# Patient Record
Sex: MALE | Race: WHITE | NOT HISPANIC OR LATINO | Employment: OTHER | URBAN - NONMETROPOLITAN AREA
[De-identification: names, ages, dates, MRNs, and addresses within clinical notes are randomized per-mention and may not be internally consistent; named-entity substitution may affect disease eponyms.]

---

## 2017-01-02 ENCOUNTER — HOSPITAL ENCOUNTER (INPATIENT)
Facility: HOSPITAL | Age: 72
LOS: 1 days | Discharge: HOME | End: 2017-01-04
Attending: INTERNAL MEDICINE | Admitting: INTERNAL MEDICINE

## 2017-01-02 ENCOUNTER — APPOINTMENT (OUTPATIENT)
Dept: CT IMAGING | Facility: HOSPITAL | Age: 72
End: 2017-01-02

## 2017-01-02 DIAGNOSIS — K56.609 SMALL BOWEL OBSTRUCTION (HCC): Primary | ICD-10-CM

## 2017-01-02 LAB
ALBUMIN SERPL-MCNC: 4 G/DL (ref 3.4–4.8)
ALBUMIN/GLOB SERPL: 1.5 G/DL (ref 1.5–2.5)
ALP SERPL-CCNC: 37 U/L (ref 46–116)
ALT SERPL W P-5'-P-CCNC: 39 U/L (ref 10–44)
ANION GAP SERPL CALCULATED.3IONS-SCNC: 6 MMOL/L (ref 3.6–11.2)
AST SERPL-CCNC: 36 U/L (ref 10–34)
BACTERIA UR QL AUTO: ABNORMAL /HPF
BASOPHILS # BLD AUTO: 0.01 10*3/MM3 (ref 0–0.3)
BASOPHILS NFR BLD AUTO: 0.1 % (ref 0–2)
BILIRUB SERPL-MCNC: 1 MG/DL (ref 0.2–1.8)
BILIRUB UR QL STRIP: NEGATIVE
BUN BLD-MCNC: 15 MG/DL (ref 7–21)
BUN/CREAT SERPL: 14.7 (ref 7–25)
CALCIUM SPEC-SCNC: 9.2 MG/DL (ref 7.7–10)
CHLORIDE SERPL-SCNC: 106 MMOL/L (ref 99–112)
CLARITY UR: CLEAR
CO2 SERPL-SCNC: 27 MMOL/L (ref 24.3–31.9)
COLOR UR: YELLOW
CREAT BLD-MCNC: 1.02 MG/DL (ref 0.43–1.29)
CRP SERPL-MCNC: 4.48 MG/DL (ref 0–0.99)
DEPRECATED RDW RBC AUTO: 49.1 FL (ref 37–54)
EOSINOPHIL # BLD AUTO: 0.08 10*3/MM3 (ref 0–0.7)
EOSINOPHIL NFR BLD AUTO: 1.1 % (ref 0–7)
ERYTHROCYTE [DISTWIDTH] IN BLOOD BY AUTOMATED COUNT: 14.6 % (ref 11.5–14.5)
GFR SERPL CREATININE-BSD FRML MDRD: 72 ML/MIN/1.73
GLOBULIN UR ELPH-MCNC: 2.6 GM/DL
GLUCOSE BLD-MCNC: 172 MG/DL (ref 70–110)
GLUCOSE BLDC GLUCOMTR-MCNC: 127 MG/DL (ref 70–130)
GLUCOSE UR STRIP-MCNC: NEGATIVE MG/DL
HBA1C MFR BLD: 6.9 % (ref 4.5–5.7)
HCT VFR BLD AUTO: 47.8 % (ref 42–52)
HGB BLD-MCNC: 15.8 G/DL (ref 14–18)
HGB UR QL STRIP.AUTO: ABNORMAL
HOLD SPECIMEN: NORMAL
HOLD SPECIMEN: NORMAL
HYALINE CASTS UR QL AUTO: ABNORMAL /LPF
IMM GRANULOCYTES # BLD: 0.03 10*3/MM3 (ref 0–0.03)
IMM GRANULOCYTES NFR BLD: 0.4 % (ref 0–0.5)
KETONES UR QL STRIP: NEGATIVE
LEUKOCYTE ESTERASE UR QL STRIP.AUTO: NEGATIVE
LIPASE SERPL-CCNC: 18 U/L (ref 13–60)
LYMPHOCYTES # BLD AUTO: 2.27 10*3/MM3 (ref 1–3)
LYMPHOCYTES NFR BLD AUTO: 30.6 % (ref 16–46)
MCH RBC QN AUTO: 30.4 PG (ref 27–33)
MCHC RBC AUTO-ENTMCNC: 33.1 G/DL (ref 33–37)
MCV RBC AUTO: 92.1 FL (ref 80–94)
MONOCYTES # BLD AUTO: 0.71 10*3/MM3 (ref 0.1–0.9)
MONOCYTES NFR BLD AUTO: 9.6 % (ref 0–12)
NEUTROPHILS # BLD AUTO: 4.33 10*3/MM3 (ref 1.4–6.5)
NEUTROPHILS NFR BLD AUTO: 58.2 % (ref 40–75)
NITRITE UR QL STRIP: NEGATIVE
OSMOLALITY SERPL CALC.SUM OF ELEC: 282.5 MOSM/KG (ref 273–305)
PH UR STRIP.AUTO: 5.5 [PH] (ref 5–8)
PLATELET # BLD AUTO: 100 10*3/MM3 (ref 130–400)
PMV BLD AUTO: 11.1 FL (ref 6–10)
POTASSIUM BLD-SCNC: 3.6 MMOL/L (ref 3.5–5.3)
PROT SERPL-MCNC: 6.6 G/DL (ref 6–8)
PROT UR QL STRIP: NEGATIVE
RBC # BLD AUTO: 5.19 10*6/MM3 (ref 4.7–6.1)
RBC # UR: ABNORMAL /HPF
REF LAB TEST METHOD: ABNORMAL
SODIUM BLD-SCNC: 139 MMOL/L (ref 135–153)
SP GR UR STRIP: >1.03 (ref 1–1.03)
SQUAMOUS #/AREA URNS HPF: ABNORMAL /HPF
UROBILINOGEN UR QL STRIP: ABNORMAL
WBC NRBC COR # BLD: 7.43 10*3/MM3 (ref 4.5–12.5)
WBC UR QL AUTO: ABNORMAL /HPF
WHOLE BLOOD HOLD SPECIMEN: NORMAL
WHOLE BLOOD HOLD SPECIMEN: NORMAL

## 2017-01-02 PROCEDURE — 83036 HEMOGLOBIN GLYCOSYLATED A1C: CPT | Performed by: INTERNAL MEDICINE

## 2017-01-02 PROCEDURE — 25010000002 MORPHINE SULFATE (PF) 2 MG/ML SOLUTION: Performed by: NURSE PRACTITIONER

## 2017-01-02 PROCEDURE — 25010000002 ONDANSETRON PER 1 MG: Performed by: NURSE PRACTITIONER

## 2017-01-02 PROCEDURE — G0378 HOSPITAL OBSERVATION PER HR: HCPCS

## 2017-01-02 PROCEDURE — 74177 CT ABD & PELVIS W/CONTRAST: CPT | Performed by: RADIOLOGY

## 2017-01-02 PROCEDURE — 83690 ASSAY OF LIPASE: CPT | Performed by: INTERNAL MEDICINE

## 2017-01-02 PROCEDURE — 81001 URINALYSIS AUTO W/SCOPE: CPT | Performed by: INTERNAL MEDICINE

## 2017-01-02 PROCEDURE — 80053 COMPREHEN METABOLIC PANEL: CPT | Performed by: INTERNAL MEDICINE

## 2017-01-02 PROCEDURE — 74177 CT ABD & PELVIS W/CONTRAST: CPT

## 2017-01-02 PROCEDURE — 85025 COMPLETE CBC W/AUTO DIFF WBC: CPT | Performed by: INTERNAL MEDICINE

## 2017-01-02 PROCEDURE — 93005 ELECTROCARDIOGRAM TRACING: CPT | Performed by: NURSE PRACTITIONER

## 2017-01-02 PROCEDURE — 93010 ELECTROCARDIOGRAM REPORT: CPT | Performed by: INTERNAL MEDICINE

## 2017-01-02 PROCEDURE — 99284 EMERGENCY DEPT VISIT MOD MDM: CPT

## 2017-01-02 PROCEDURE — 86140 C-REACTIVE PROTEIN: CPT | Performed by: INTERNAL MEDICINE

## 2017-01-02 PROCEDURE — 25010000002 HYDROMORPHONE PER 4 MG: Performed by: NURSE PRACTITIONER

## 2017-01-02 PROCEDURE — 0 IOPAMIDOL 61 % SOLUTION: Performed by: NURSE PRACTITIONER

## 2017-01-02 PROCEDURE — 99223 1ST HOSP IP/OBS HIGH 75: CPT | Performed by: INTERNAL MEDICINE

## 2017-01-02 PROCEDURE — 82962 GLUCOSE BLOOD TEST: CPT

## 2017-01-02 RX ORDER — POTASSIUM CHLORIDE 750 MG/1
10 TABLET, FILM COATED, EXTENDED RELEASE ORAL EVERY MORNING
Status: CANCELLED | OUTPATIENT
Start: 2017-01-03

## 2017-01-02 RX ORDER — CITALOPRAM 40 MG/1
40 TABLET ORAL DAILY
COMMUNITY

## 2017-01-02 RX ORDER — LISINOPRIL 2.5 MG/1
2.5 TABLET ORAL DAILY
Status: CANCELLED | OUTPATIENT
Start: 2017-01-03

## 2017-01-02 RX ORDER — ZOLPIDEM TARTRATE 5 MG/1
5 TABLET ORAL NIGHTLY
Status: CANCELLED | OUTPATIENT
Start: 2017-01-03

## 2017-01-02 RX ORDER — LORAZEPAM 0.5 MG/1
0.5 TABLET ORAL EVERY 8 HOURS PRN
Status: CANCELLED | OUTPATIENT
Start: 2017-01-02

## 2017-01-02 RX ORDER — OXYCODONE HCL 10 MG/1
10 TABLET, FILM COATED, EXTENDED RELEASE ORAL 3 TIMES DAILY PRN
Status: CANCELLED | OUTPATIENT
Start: 2017-01-02

## 2017-01-02 RX ORDER — CHLORZOXAZONE 500 MG/1
500 TABLET ORAL 4 TIMES DAILY PRN
COMMUNITY

## 2017-01-02 RX ORDER — SODIUM CHLORIDE 0.9 % (FLUSH) 0.9 %
10 SYRINGE (ML) INJECTION AS NEEDED
Status: DISCONTINUED | OUTPATIENT
Start: 2017-01-02 | End: 2017-01-04 | Stop reason: HOSPADM

## 2017-01-02 RX ORDER — FUROSEMIDE 80 MG
80 TABLET ORAL DAILY
Status: ON HOLD | COMMUNITY
End: 2017-01-04

## 2017-01-02 RX ORDER — POTASSIUM CHLORIDE 750 MG/1
10 TABLET, FILM COATED, EXTENDED RELEASE ORAL EVERY MORNING
COMMUNITY

## 2017-01-02 RX ORDER — ONDANSETRON 2 MG/ML
4 INJECTION INTRAMUSCULAR; INTRAVENOUS EVERY 6 HOURS PRN
Status: DISCONTINUED | OUTPATIENT
Start: 2017-01-02 | End: 2017-01-02

## 2017-01-02 RX ORDER — FUROSEMIDE 80 MG
80 TABLET ORAL DAILY
Status: CANCELLED | OUTPATIENT
Start: 2017-01-03

## 2017-01-02 RX ORDER — PIOGLITAZONEHYDROCHLORIDE 15 MG/1
30 TABLET ORAL DAILY
Status: CANCELLED | OUTPATIENT
Start: 2017-01-03

## 2017-01-02 RX ORDER — LISINOPRIL 2.5 MG/1
2.5 TABLET ORAL DAILY
COMMUNITY

## 2017-01-02 RX ORDER — GLYBURIDE 5 MG/1
10 TABLET ORAL 2 TIMES DAILY
COMMUNITY

## 2017-01-02 RX ORDER — PANTOPRAZOLE SODIUM 40 MG/10ML
40 INJECTION, POWDER, LYOPHILIZED, FOR SOLUTION INTRAVENOUS
Status: DISCONTINUED | OUTPATIENT
Start: 2017-01-03 | End: 2017-01-04

## 2017-01-02 RX ORDER — HYDROMORPHONE HYDROCHLORIDE 1 MG/ML
0.5 INJECTION, SOLUTION INTRAMUSCULAR; INTRAVENOUS; SUBCUTANEOUS ONCE
Status: COMPLETED | OUTPATIENT
Start: 2017-01-02 | End: 2017-01-02

## 2017-01-02 RX ORDER — NORTRIPTYLINE HYDROCHLORIDE 25 MG/1
50 CAPSULE ORAL NIGHTLY
Status: CANCELLED | OUTPATIENT
Start: 2017-01-03

## 2017-01-02 RX ORDER — PIOGLITAZONEHYDROCHLORIDE 30 MG/1
30 TABLET ORAL DAILY
COMMUNITY

## 2017-01-02 RX ORDER — ASPIRIN 81 MG/1
81 TABLET, CHEWABLE ORAL DAILY
COMMUNITY

## 2017-01-02 RX ORDER — PREGABALIN 100 MG/1
100 CAPSULE ORAL 3 TIMES DAILY
COMMUNITY

## 2017-01-02 RX ORDER — SODIUM CHLORIDE 9 MG/ML
INJECTION, SOLUTION INTRAVENOUS
Status: DISPENSED
Start: 2017-01-02 | End: 2017-01-03

## 2017-01-02 RX ORDER — HEPARIN SODIUM 5000 [USP'U]/ML
5000 INJECTION, SOLUTION INTRAVENOUS; SUBCUTANEOUS EVERY 12 HOURS
Status: DISCONTINUED | OUTPATIENT
Start: 2017-01-02 | End: 2017-01-02

## 2017-01-02 RX ORDER — GLIPIZIDE 5 MG/1
10 TABLET ORAL
Status: CANCELLED | OUTPATIENT
Start: 2017-01-03

## 2017-01-02 RX ORDER — SODIUM CHLORIDE 0.9 % (FLUSH) 0.9 %
1-10 SYRINGE (ML) INJECTION AS NEEDED
Status: DISCONTINUED | OUTPATIENT
Start: 2017-01-02 | End: 2017-01-04 | Stop reason: HOSPADM

## 2017-01-02 RX ORDER — SODIUM CHLORIDE 9 MG/ML
50 INJECTION, SOLUTION INTRAVENOUS CONTINUOUS
Status: DISCONTINUED | OUTPATIENT
Start: 2017-01-02 | End: 2017-01-04

## 2017-01-02 RX ORDER — ONDANSETRON 2 MG/ML
4 INJECTION INTRAMUSCULAR; INTRAVENOUS ONCE
Status: COMPLETED | OUTPATIENT
Start: 2017-01-02 | End: 2017-01-02

## 2017-01-02 RX ORDER — HYDRALAZINE HYDROCHLORIDE 20 MG/ML
10 INJECTION INTRAMUSCULAR; INTRAVENOUS EVERY 6 HOURS PRN
Status: DISCONTINUED | OUTPATIENT
Start: 2017-01-02 | End: 2017-01-04 | Stop reason: HOSPADM

## 2017-01-02 RX ORDER — LORAZEPAM 0.5 MG/1
0.5 TABLET ORAL EVERY 8 HOURS PRN
COMMUNITY

## 2017-01-02 RX ORDER — ASPIRIN 81 MG/1
81 TABLET, CHEWABLE ORAL DAILY
Status: CANCELLED | OUTPATIENT
Start: 2017-01-03

## 2017-01-02 RX ORDER — HYDROMORPHONE HYDROCHLORIDE 1 MG/ML
0.5 INJECTION, SOLUTION INTRAMUSCULAR; INTRAVENOUS; SUBCUTANEOUS
Status: DISCONTINUED | OUTPATIENT
Start: 2017-01-02 | End: 2017-01-04 | Stop reason: HOSPADM

## 2017-01-02 RX ORDER — CITALOPRAM 20 MG/1
40 TABLET ORAL DAILY
Status: CANCELLED | OUTPATIENT
Start: 2017-01-03

## 2017-01-02 RX ORDER — NALOXONE HCL 0.4 MG/ML
0.4 VIAL (ML) INJECTION
Status: DISCONTINUED | OUTPATIENT
Start: 2017-01-02 | End: 2017-01-04 | Stop reason: HOSPADM

## 2017-01-02 RX ORDER — LORAZEPAM 2 MG/ML
0.25 INJECTION INTRAMUSCULAR EVERY 6 HOURS PRN
Status: DISCONTINUED | OUTPATIENT
Start: 2017-01-02 | End: 2017-01-04 | Stop reason: HOSPADM

## 2017-01-02 RX ORDER — PREDNISONE 10 MG/1
10 TABLET ORAL EVERY MORNING
COMMUNITY

## 2017-01-02 RX ORDER — OXYCODONE HCL 10 MG/1
10 TABLET, FILM COATED, EXTENDED RELEASE ORAL 3 TIMES DAILY PRN
COMMUNITY

## 2017-01-02 RX ORDER — CHLORAL HYDRATE 500 MG
2000 CAPSULE ORAL 2 TIMES DAILY WITH MEALS
COMMUNITY

## 2017-01-02 RX ORDER — PREDNISONE 10 MG/1
10 TABLET ORAL EVERY MORNING
Status: CANCELLED | OUTPATIENT
Start: 2017-01-03

## 2017-01-02 RX ORDER — SODIUM CHLORIDE 9 MG/ML
75 INJECTION, SOLUTION INTRAVENOUS ONCE
Status: COMPLETED | OUTPATIENT
Start: 2017-01-02 | End: 2017-01-02

## 2017-01-02 RX ORDER — MORPHINE SULFATE 2 MG/ML
2 INJECTION, SOLUTION INTRAMUSCULAR; INTRAVENOUS ONCE
Status: COMPLETED | OUTPATIENT
Start: 2017-01-02 | End: 2017-01-02

## 2017-01-02 RX ORDER — NORTRIPTYLINE HYDROCHLORIDE 50 MG/1
50 CAPSULE ORAL NIGHTLY
COMMUNITY

## 2017-01-02 RX ORDER — ZOLPIDEM TARTRATE 10 MG/1
10 TABLET ORAL NIGHTLY
COMMUNITY
End: 2017-01-04 | Stop reason: HOSPADM

## 2017-01-02 RX ORDER — PREGABALIN 100 MG/1
100 CAPSULE ORAL 3 TIMES DAILY
Status: CANCELLED | OUTPATIENT
Start: 2017-01-03

## 2017-01-02 RX ORDER — CHLORZOXAZONE 500 MG/1
500 TABLET ORAL 4 TIMES DAILY PRN
Status: CANCELLED | OUTPATIENT
Start: 2017-01-02

## 2017-01-02 RX ADMIN — MORPHINE SULFATE 2 MG: 2 INJECTION, SOLUTION INTRAMUSCULAR; INTRAVENOUS at 15:32

## 2017-01-02 RX ADMIN — ONDANSETRON 4 MG: 2 INJECTION, SOLUTION INTRAMUSCULAR; INTRAVENOUS at 18:10

## 2017-01-02 RX ADMIN — SODIUM CHLORIDE 75 ML/HR: 9 INJECTION, SOLUTION INTRAVENOUS at 18:09

## 2017-01-02 RX ADMIN — SODIUM CHLORIDE 100 ML/HR: 9 INJECTION, SOLUTION INTRAVENOUS at 21:49

## 2017-01-02 RX ADMIN — HYDROMORPHONE HYDROCHLORIDE 0.5 MG: 1 INJECTION, SOLUTION INTRAMUSCULAR; INTRAVENOUS; SUBCUTANEOUS at 19:41

## 2017-01-02 RX ADMIN — ONDANSETRON 4 MG: 2 INJECTION, SOLUTION INTRAMUSCULAR; INTRAVENOUS at 15:32

## 2017-01-02 RX ADMIN — IOPAMIDOL 100 ML: 612 INJECTION, SOLUTION INTRAVENOUS at 16:05

## 2017-01-02 RX ADMIN — SODIUM CHLORIDE 250 ML: 9 INJECTION, SOLUTION INTRAVENOUS at 15:33

## 2017-01-02 RX ADMIN — HYDROMORPHONE HYDROCHLORIDE 0.5 MG: 1 INJECTION, SOLUTION INTRAMUSCULAR; INTRAVENOUS; SUBCUTANEOUS at 18:10

## 2017-01-02 NOTE — IP AVS SNAPSHOT
AFTER VISIT SUMMARY             Moe Bridges           About your hospitalization     You were admitted on:  January 2, 2017 You last received care in the:  [address]       Procedures & Surgeries         Medications    If you or your caregiver advised us that you are currently taking a medication and that medication is marked below as “Resume”, this simply indicates that we have reviewed those medications to make sure our new therapy recommendations do not interfere.  If you have concerns about medications other than those new ones which we are prescribing today, please consult the physician who prescribed them (or your primary physician).  Our review of your home medications is not meant to indicate that we are directing their use.             Your Medications      CHANGE how you take these medications     furosemide 80 MG tablet   Take 0.5 tablets by mouth Daily As Needed (for swelling).   Commonly known as:  LASIX   What changed:    - how much to take  - when to take this  - reasons to take this             CONTINUE taking these medications     ACTOS 30 MG tablet   Take 30 mg by mouth Daily.   Generic drug:  pioglitazone           aspirin 81 MG chewable tablet   Chew 81 mg Daily.   Last time this was given:  1/4/2017  1:16 PM           chlorzoxazone 500 MG tablet   Take 500 mg by mouth 4 (Four) Times a Day As Needed for muscle spasms.   Commonly known as:  PARAFON FORTE           citalopram 40 MG tablet   Take 40 mg by mouth Daily.   Last time this was given:  1/4/2017  1:15 PM   Commonly known as:  CeleXA           fish oil 1000 MG capsule capsule   Take 2,000 mg by mouth 2 (Two) Times a Day With Meals.           glyBURIDE 5 MG tablet   Take 10 mg by mouth 2 (Two) Times a Day.   Commonly known as:  DIAbeta           lisinopril 2.5 MG tablet   Take 2.5 mg by mouth Daily.   Last time this was given:  1/4/2017  1:15 PM   Commonly known as:  PRINIVIL,ZESTRIL           LORazepam 0.5 MG  tablet   Take 0.5 mg by mouth Every 8 (Eight) Hours As Needed for anxiety.   Commonly known as:  ATIVAN           nortriptyline 50 MG capsule   Take 50 mg by mouth Every Night.   Commonly known as:  PAMELOR           oxyCODONE 10 MG 12 hr tablet   Take 10 mg by mouth 3 (Three) Times a Day As Needed for severe pain (7-10).   Commonly known as:  oxyCONTIN           potassium chloride 10 MEQ CR tablet   Take 10 mEq by mouth Every Morning.   Commonly known as:  K-DUR           predniSONE 10 MG tablet   Take 10 mg by mouth Every Morning.   Last time this was given:  1/4/2017  1:16 PM   Commonly known as:  DELTASONE           pregabalin 100 MG capsule   Take 100 mg by mouth 3 (Three) Times a Day.   Last time this was given:  1/4/2017  1:31 PM   Commonly known as:  LYRICA             STOP taking these medications     AMBIEN 10 MG tablet   Generic drug:  zolpidem                Where to Get Your Medications      Information about where to get these medications is not yet available     ! Ask your nurse or doctor about these medications     furosemide 80 MG tablet                  Your Medications      Your Medication List           Morning Noon Evening Bedtime As Needed    ACTOS 30 MG tablet   Take 30 mg by mouth Daily.   Generic drug:  pioglitazone                                aspirin 81 MG chewable tablet   Chew 81 mg Daily.                                chlorzoxazone 500 MG tablet   Take 500 mg by mouth 4 (Four) Times a Day As Needed for muscle spasms.   Commonly known as:  PARAFON FORTE                                citalopram 40 MG tablet   Take 40 mg by mouth Daily.   Commonly known as:  CeleXA                                fish oil 1000 MG capsule capsule   Take 2,000 mg by mouth 2 (Two) Times a Day With Meals.                                furosemide 80 MG tablet   Take 0.5 tablets by mouth Daily As Needed (for swelling).   Commonly known as:  LASIX                                glyBURIDE 5 MG tablet   Take 10  mg by mouth 2 (Two) Times a Day.   Commonly known as:  DIAbeta                                lisinopril 2.5 MG tablet   Take 2.5 mg by mouth Daily.   Commonly known as:  PRINIVIL,ZESTRIL                                LORazepam 0.5 MG tablet   Take 0.5 mg by mouth Every 8 (Eight) Hours As Needed for anxiety.   Commonly known as:  ATIVAN                                nortriptyline 50 MG capsule   Take 50 mg by mouth Every Night.   Commonly known as:  PAMELOR                                oxyCODONE 10 MG 12 hr tablet   Take 10 mg by mouth 3 (Three) Times a Day As Needed for severe pain (7-10).   Commonly known as:  oxyCONTIN                                potassium chloride 10 MEQ CR tablet   Take 10 mEq by mouth Every Morning.   Commonly known as:  K-DUR                                predniSONE 10 MG tablet   Take 10 mg by mouth Every Morning.   Commonly known as:  DELTASONE                                pregabalin 100 MG capsule   Take 100 mg by mouth 3 (Three) Times a Day.   Commonly known as:  LYRICA                                         Instructions for After Discharge        Activity Instructions     Activity as Tolerated                 Diet Instructions     Diet: Regular, Consistent Carbohydrate, Cardiac; Thin Liquids, No Restrictions       Discharge Diet:   Regular  Consistent Carbohydrate  Cardiac      Fluid Consistency:  Thin Liquids, No Restrictions             Discharge References/Attachments     SMALL BOWEL OBSTRUCTION (ENGLISH)    ABDOMINAL PAIN, ADULT (ENGLISH)    DIABETES MELLITUS AND FOOD (ENGLISH)    BASIC CARBOHYDRATE COUNTING FOR DIABETES (ENGLISH)    CARDIAC DIET, EASY-TO-READ (ENGLISH)       Follow-ups for After Discharge        Follow-up Information     Follow up with Chirs Haji MD .    Specialty:  General Practice    Contact information:    [address]  [place] [address] [phone]        Referrals and Follow-ups to Schedule     Follow-Up    As directed    Follow Up Details:   with PCP in one week             Scheduled Appointments     Apr 03, 2017  2:20 PM EDT   Follow Up with Diallo Francis MD   [facility] CARDIOLOGY (--)    [address] [place] [address] [phone]           Arrive 15 minutes prior to appointment.            Additional instructions:      Follow-up appointment with Chris Haji Jan. 18, 2017 at 1:00              [employer] Signup     Our records indicate that you have an active [employer] account.    You can view your After Visit Summary by going to [employer] and logging in with your [employer] username and password.  If you don't have a [employer] username and password but a parent or guardian has access to your record, the parent or guardian should login with their own [employer] username and password and access your record to view the After Visit Summary.    If you have questions, you can email [email or web address] or call [phone] to talk to our [employer] staff.  Remember, [employer] is NOT to be used for urgent needs.  For medical emergencies, dial 911.           Summary of Your Hospitalization        Reason for Hospitalization     Your primary diagnosis was:  Small Bowel Obstruction      Care Providers     Provider Service Role Specialty    Maryanne Sanders DO Medicine Attending Provider Hospitalist    Maryanne Sanders DO Medicine Consulting Physician  Hospitalist      Your Allergies  Date Reviewed: 1/2/2017    Allergen Reactions    Handclens 2 In 1 (Benzalkonium Chloride) Not Noted      Patient Belongings Returned     Document Return of Belongings Flowsheet     Were the patient bedside belongings sent home?   --   Belongings Retrieved from Security & Sent Home   --    Belongings Sent to Safe   --   Medications Retrieved from Pharmacy & Sent Home   --              More Information      Small Bowel Obstruction  A small bowel obstruction is a blockage in the small bowel. The small bowel, which is also  called the small intestine, is a long, slender tube that connects the stomach to the colon. When a person eats and drinks, food and fluids go from the stomach to the small bowel. This is where most of the nutrients in the food and fluids are absorbed.  A small bowel obstruction will prevent food and fluids from passing through the small bowel as they normally do during digestion. The small bowel can become partially or completely blocked. This can cause symptoms such as abdominal pain, vomiting, and bloating. If this condition is not treated, it can be dangerous because the small bowel could rupture.  CAUSES  Common causes of this condition include:  · Scar tissue from previous surgery or radiation treatment.  · Recent surgery. This may cause the movements of the bowel to slow down and cause food to block the intestine.  · Hernias.  · Inflammatory bowel disease (colitis).  · Twisting of the bowel (volvulus).  · Tumors.  · A foreign body.  · Slipping of a part of the bowel into another part (intussusception).  SYMPTOMS  Symptoms of this condition include:  · Abdominal pain. This may be dull cramps or sharp pain. It may occur in one area, or it may be present in the entire abdomen. Pain can range from mild to severe, depending on the degree of obstruction.  · Nausea and vomiting. Vomit may be greenish or a yellow bile color.  · Abdominal bloating.  · Constipation.  · Lack of passing gas.  · Frequent belching.  · Diarrhea. This may occur if the obstruction is partial and runny stool is able to leak around the obstruction.  DIAGNOSIS  This condition may be diagnosed based on a physical exam, medical history, and X-rays of the abdomen. You may also have other tests, such as a CT scan of the abdomen and pelvis.  TREATMENT  Treatment for this condition depends on the cause and severity of the problem. Treatment options may include:  · Bed rest along with fluids and pain medicines that are given through an IV tube inserted  into one of your veins. Sometimes, this is all that is needed for the obstruction to improve.  · Following a simple diet. In some cases, a clear liquid diet may be required for several days. This allows the bowel to rest.  · Placement of a small tube (nasogastric tube) into the stomach. When the bowel is blocked, it usually swells up like a balloon that is filled with air and fluids. The air and fluids may be removed by suction through the nasogastric tube. This can help with pain, discomfort, and nausea. It can also help the obstruction to clear up faster.  · Surgery. This may be required if other treatments do not work. Bowel obstruction from a hernia may require early surgery and can be an emergency procedure. Surgery may also be required for scar tissue that causes frequent or severe obstructions.  HOME CARE INSTRUCTIONS  · Get plenty of rest.  · Follow instructions from your health care provider about eating restrictions. You may need to avoid solid foods and consume only clear liquids until your condition improves.  · Take over-the-counter and prescription medicines only as told by your health care provider.  · Keep all follow-up visits as told by your health care provider. This is important.  SEEK MEDICAL CARE IF:  · You have a fever.  · You have chills.  SEEK IMMEDIATE MEDICAL CARE IF:  · You have increased pain or cramping.  · You vomit blood.  · You have uncontrolled vomiting or nausea.  · You cannot drink fluids because of vomiting or pain.  · You develop confusion.  · You begin feeling very dry or thirsty (dehydrated).  · You have severe bloating.  · You feel extremely weak or you faint.     This information is not intended to replace advice given to you by your health care provider. Make sure you discuss any questions you have with your health care provider.     Document Released: 03/06/2007 Document Revised: 09/07/2016 Document Reviewed: 02/11/2016  Elsevier Interactive Patient Education ©2016 Elsevier  Inc.          Abdominal Pain, Adult  Many things can cause abdominal pain. Usually, abdominal pain is not caused by a disease and will improve without treatment. It can often be observed and treated at home. Your health care provider will do a physical exam and possibly order blood tests and X-rays to help determine the seriousness of your pain. However, in many cases, more time must pass before a clear cause of the pain can be found. Before that point, your health care provider may not know if you need more testing or further treatment.  HOME CARE INSTRUCTIONS  Monitor your abdominal pain for any changes. The following actions may help to alleviate any discomfort you are experiencing:  · Only take over-the-counter or prescription medicines as directed by your health care provider.  · Do not take laxatives unless directed to do so by your health care provider.  · Try a clear liquid diet (broth, tea, or water) as directed by your health care provider. Slowly move to a bland diet as tolerated.  SEEK MEDICAL CARE IF:  · You have unexplained abdominal pain.  · You have abdominal pain associated with nausea or diarrhea.  · You have pain when you urinate or have a bowel movement.  · You experience abdominal pain that wakes you in the night.  · You have abdominal pain that is worsened or improved by eating food.  · You have abdominal pain that is worsened with eating fatty foods.  · You have a fever.  SEEK IMMEDIATE MEDICAL CARE IF:  · Your pain does not go away within 2 hours.  · You keep throwing up (vomiting).  · Your pain is felt only in portions of the abdomen, such as the right side or the left lower portion of the abdomen.  · You pass bloody or black tarry stools.  MAKE SURE YOU:  · Understand these instructions.  · Will watch your condition.  · Will get help right away if you are not doing well or get worse.     This information is not intended to replace advice given to you by your health care provider. Make sure  you discuss any questions you have with your health care provider.     Document Released: 09/27/2006 Document Revised: 09/07/2016 Document Reviewed: 08/27/2014  [employer] Interactive Patient Education ©2016 Elsevier Inc.          Diabetes Mellitus and Food  It is important for you to manage your blood sugar (glucose) level. Your blood glucose level can be greatly affected by what you eat. Eating healthier foods in the appropriate amounts throughout the day at about the same time each day will help you control your blood glucose level. It can also help slow or prevent worsening of your diabetes mellitus. Healthy eating may even help you improve the level of your blood pressure and reach or maintain a healthy weight.   General recommendations for healthful eating and cooking habits include:  · Eating meals and snacks regularly. Avoid going long periods of time without eating to lose weight.  · Eating a diet that consists mainly of plant-based foods, such as fruits, vegetables, nuts, legumes, and whole grains.  · Using low-heat cooking methods, such as baking, instead of high-heat cooking methods, such as deep frying.  Work with your dietitian to make sure you understand how to use the Nutrition Facts information on food labels.  HOW CAN FOOD AFFECT ME?  Carbohydrates  Carbohydrates affect your blood glucose level more than any other type of food. Your dietitian will help you determine how many carbohydrates to eat at each meal and teach you how to count carbohydrates. Counting carbohydrates is important to keep your blood glucose at a healthy level, especially if you are using insulin or taking certain medicines for diabetes mellitus.  Alcohol  Alcohol can cause sudden decreases in blood glucose (hypoglycemia), especially if you use insulin or take certain medicines for diabetes mellitus. Hypoglycemia can be a life-threatening condition. Symptoms of hypoglycemia (sleepiness, dizziness, and disorientation) are similar  to symptoms of having too much alcohol.   If your health care provider has given you approval to drink alcohol, do so in moderation and use the following guidelines:  · Women should not have more than one drink per day, and men should not have more than two drinks per day. One drink is equal to:    12 oz of beer.    5 oz of wine.    1½ oz of hard liquor.  · Do not drink on an empty stomach.  · Keep yourself hydrated. Have water, diet soda, or unsweetened iced tea.  · Regular soda, juice, and other mixers might contain a lot of carbohydrates and should be counted.  WHAT FOODS ARE NOT RECOMMENDED?  As you make food choices, it is important to remember that all foods are not the same. Some foods have fewer nutrients per serving than other foods, even though they might have the same number of calories or carbohydrates. It is difficult to get your body what it needs when you eat foods with fewer nutrients. Examples of foods that you should avoid that are high in calories and carbohydrates but low in nutrients include:  · Trans fats (most processed foods list trans fats on the Nutrition Facts label).  · Regular soda.  · Juice.  · Candy.  · Sweets, such as cake, pie, doughnuts, and cookies.  · Fried foods.  WHAT FOODS CAN I EAT?  Eat nutrient-rich foods, which will nourish your body and keep you healthy. The food you should eat also will depend on several factors, including:  · The calories you need.  · The medicines you take.  · Your weight.  · Your blood glucose level.  · Your blood pressure level.  · Your cholesterol level.  You should eat a variety of foods, including:  · Protein.    Lean cuts of meat.    Proteins low in saturated fats, such as fish, egg whites, and beans. Avoid processed meats.  · Fruits and vegetables.    Fruits and vegetables that may help control blood glucose levels, such as apples, mangoes, and yams.  · Dairy products.    Choose fat-free or low-fat dairy products, such as milk, yogurt, and  cheese.  · Grains, bread, pasta, and rice.    Choose whole grain products, such as multigrain bread, whole oats, and brown rice. These foods may help control blood pressure.  · Fats.    Foods containing healthful fats, such as nuts, avocado, olive oil, canola oil, and fish.  DOES EVERYONE WITH DIABETES MELLITUS HAVE THE SAME MEAL PLAN?  Because every person with diabetes mellitus is different, there is not one meal plan that works for everyone. It is very important that you meet with a dietitian who will help you create a meal plan that is just right for you.     This information is not intended to replace advice given to you by your health care provider. Make sure you discuss any questions you have with your health care provider.     Document Released: 09/14/2006 Document Revised: 01/08/2016 Document Reviewed: 11/14/2014  "[employer] Interactive Patient Education ©2016 "[employer] Inc.          Basic Carbohydrate Counting for Diabetes Mellitus  Carbohydrate counting is a method for keeping track of the amount of carbohydrates you eat. Eating carbohydrates naturally increases the level of sugar (glucose) in your blood, so it is important for you to know the amount that is okay for you to have in every meal. Carbohydrate counting helps keep the level of glucose in your blood within normal limits. The amount of carbohydrates allowed is different for every person. A dietitian can help you calculate the amount that is right for you. Once you know the amount of carbohydrates you can have, you can count the carbohydrates in the foods you want to eat.  Carbohydrates are found in the following foods:  · Grains, such as breads and cereals.  · Dried beans and soy products.  · Starchy vegetables, such as potatoes, peas, and corn.  · Fruit and fruit juices.  · Milk and yogurt.  · Sweets and snack foods, such as cake, cookies, candy, chips, soft drinks, and fruit drinks.  CARBOHYDRATE COUNTING  There are two ways to count the  "carbohydrates in your food. You can use either of the methods or a combination of both.  Reading the \"Nutrition Facts\" on Packaged Food  The \"Nutrition Facts\" is an area that is included on the labels of almost all packaged food and beverages in the [place]. It includes the serving size of that food or beverage and information about the nutrients in each serving of the food, including the grams (g) of carbohydrate per serving.   Decide the number of servings of this food or beverage that you will be able to eat or drink. Multiply that number of servings by the number of grams of carbohydrate that is listed on the label for that serving. The total will be the amount of carbohydrates you will be having when you eat or drink this food or beverage.  Learning Standard Serving Sizes of Food  When you eat food that is not packaged or does not include \"Nutrition Facts\" on the label, you need to measure the servings in order to count the amount of carbohydrates. A serving of most carbohydrate-rich foods contains about 15 g of carbohydrates. The following list includes serving sizes of carbohydrate-rich foods that provide 15 g of carbohydrate per serving:    · 1 slice of bread (1 oz) or 1 six-inch tortilla.    · ½ of a hamburger bun or English muffin.  · 4-6 crackers.  · ¾ cup unsweetened dry cereal.    · ½ cup hot cereal.  · ? cup rice or pasta.    · ½ cup mashed potatoes or ¼ of a large baked potato.  · 1 cup fresh fruit or one small piece of fruit.    · ½ cup canned or frozen fruit or fruit juice.  · 1 cup milk.  · ? cup plain fat-free yogurt or yogurt sweetened with artificial sweeteners.  · ½ cup cooked dried beans or starchy vegetable, such as peas, corn, or potatoes.    Decide the number of standard-size servings that you will eat. Multiply that number of servings by 15 (the grams of carbohydrates in that serving). For example, if you eat 2 cups of strawberries, you will have eaten 2 servings and 30 g of " carbohydrates (2 servings x 15 g = 30 g). For foods such as soups and casseroles, in which more than one food is mixed in, you will need to count the carbohydrates in each food that is included.  EXAMPLE OF CARBOHYDRATE COUNTING  Sample Dinner   · 3 oz chicken breast.  · ? cup of brown rice.  · ½ cup of corn.  · 1 cup milk.    · 1 cup strawberries with sugar-free whipped topping.    Carbohydrate Calculation   Step 1: Identify the foods that contain carbohydrates:   · Rice.    · Corn.    · Milk.    · Strawberries.  Step 2: Calculate the number of servings eaten of each:   · 2 servings of rice.    · 1 serving of corn.    · 1 serving of milk.    · 1 serving of strawberries.  Step 3:  Multiply each of those number of servings by 15 g:   · 2 servings of rice x 15 g = 30 g.    · 1 serving of corn x 15 g = 15 g.    · 1 serving of milk x 15 g = 15 g.    · 1 serving of strawberries x 15 g = 15 g.  Step 4: Add together all of the amounts to find the total grams of carbohydrates eaten: 30 g + 15 g + 15 g + 15 g = 75 g.     This information is not intended to replace advice given to you by your health care provider. Make sure you discuss any questions you have with your health care provider.     Document Released: 12/18/2006 Document Revised: 01/08/2016 Document Reviewed: 11/14/2014  [employer] Interactive Patient Education ©2016 [employer].          Heart-Healthy Eating Plan  Heart-healthy meal planning includes:  · Limiting unhealthy fats.  · Increasing healthy fats.  · Making other small dietary changes.  You may need to talk with your doctor or a diet specialist (dietitian) to create an eating plan that is right for you.  WHAT TYPES OF FAT SHOULD I CHOOSE?  · Choose healthy fats. These include olive oil and canola oil, flaxseeds, walnuts, almonds, and seeds.  · Eat more omega-3 fats. These include salmon, mackerel, sardines, tuna, flaxseed oil, and ground flaxseeds. Try to eat fish at least twice each week.  · Limit  "saturated fats.    Saturated fats are often found in animal products, such as meats, butter, and cream.    Plant sources of saturated fats include palm oil, palm kernel oil, and coconut oil.  · Avoid foods with partially hydrogenated oils in them. These include stick margarine, some tub margarines, cookies, crackers, and other baked goods. These contain trans fats.  WHAT GENERAL GUIDELINES DO I NEED TO FOLLOW?  · Check food labels carefully. Identify foods with trans fats or high amounts of saturated fat.  · Fill one half of your plate with vegetables and green salads. Eat 4-5 servings of vegetables per day. A serving of vegetables is:    1 cup of raw leafy vegetables.    ½ cup of raw or cooked cut-up vegetables.    ½ cup of vegetable juice.  · Fill one fourth of your plate with whole grains. Look for the word \"whole\" as the first word in the ingredient list.  · Fill one fourth of your plate with lean protein foods.  · Eat 4-5 servings of fruit per day. A serving of fruit is:    One medium whole fruit.    ¼ cup of dried fruit.    ½ cup of fresh, frozen, or canned fruit.    ½ cup of 100% fruit juice.  · Eat more foods that contain soluble fiber. These include apples, broccoli, carrots, beans, peas, and barley. Try to get 20-30 g of fiber per day.  · Eat more home-cooked food. Eat less restaurant, buffet, and fast food.  · Limit or avoid alcohol.  · Limit foods high in starch and sugar.  · Avoid fried foods.  · Avoid frying your food. Try baking, boiling, grilling, or broiling it instead. You can also reduce fat by:    Removing the skin from poultry.    Removing all visible fats from meats.    Skimming the fat off of stews, soups, and gravies before serving them.    Steaming vegetables in water or broth.  · Lose weight if you are overweight.  · Eat 4-5 servings of nuts, legumes, and seeds per week:    One serving of dried beans or legumes equals ½ cup after being cooked.    One serving of nuts equals 1½ ounces.    One " serving of seeds equals ½ ounce or one tablespoon.  · You may need to keep track of how much salt or sodium you eat. This is especially true if you have high blood pressure. Talk with your doctor or dietitian to get more information.  WHAT FOODS CAN I EAT?  Grains  Breads, including [place], white, soheila, wheat, raisin, rye, oatmeal, and Italian. Tortillas that are neither fried nor made with lard or trans fat. Low-fat rolls, including hotdog and hamburger buns and English muffins. Biscuits. Muffins. Waffles. Pancakes. Light popcorn. Whole-grain cereals. Flatbread. [place] toast. Pretzels. Breadsticks. Rusks. Low-fat snacks. Low-fat crackers, including oyster, saltine, matzo, deborah, animal, and rye. Rice and pasta, including brown rice and pastas that are made with whole wheat.   Vegetables  All vegetables.   Fruits  All fruits, but limit coconut.  Meats and Other Protein Sources  Lean, well-trimmed beef, veal, pork, and lamb. Chicken and turkey without skin. All fish and shellfish. Wild duck, rabbit, pheasant, and venison. Egg whites or low-cholesterol egg substitutes. Dried beans, peas, lentils, and tofu. Seeds and most nuts.  Dairy  Low-fat or nonfat cheeses, including ricotta, string, and mozzarella. Skim or 1% milk that is liquid, powdered, or evaporated. Buttermilk that is made with low-fat milk. Nonfat or low-fat yogurt.  Beverages  Mineral water. Diet carbonated beverages.  Sweets and Desserts  Sherbets and fruit ices. Honey, jam, marmalade, jelly, and syrups. Meringues and gelatins. Pure sugar candy, such as hard candy, jelly beans, gumdrops, mints, marshmallows, and small amounts of dark chocolate. [place]apha food cake.  Eat all sweets and desserts in moderation.  Fats and Oils  Nonhydrogenated (trans-free) margarines. Vegetable oils, including soybean, sesame, sunflower, olive, peanut, safflower, corn, canola, and cottonseed. Salad dressings or mayonnaise made with a vegetable oil. Limit added fats and oils that  you use for cooking, baking, salads, and as spreads.  Other  Cocoa powder. Coffee and tea. All seasonings and condiments.  The items listed above may not be a complete list of recommended foods or beverages. Contact your dietitian for more options.  WHAT FOODS ARE NOT RECOMMENDED?  Grains  Breads that are made with saturated or trans fats, oils, or whole milk. Croissants. Butter rolls. Cheese breads. Sweet rolls. Donuts. Buttered popcorn. Chow mein noodles. High-fat crackers, such as cheese or butter crackers.  Meats and Other Protein Sources  Fatty meats, such as hotdogs, short ribs, sausage, spareribs, burciaga, rib eye roast or steak, and mutton. High-fat deli meats, such as salami and bologna. Caviar. Domestic duck and goose. Organ meats, such as kidney, liver, sweetbreads, and heart.  Dairy  Cream, sour cream, cream cheese, and creamed cottage cheese. Whole-milk cheeses, including blue (anika), Kinde Jean Marie, Brie, Tree, American, Havarti, Swiss, cheddar, Camembert, and Delight. Whole or 2% milk that is liquid, evaporated, or condensed. Whole buttermilk. Cream sauce or high-fat cheese sauce. Yogurt that is made from whole milk.  Beverages  Regular sodas and juice drinks with added sugar.  Sweets and Desserts  Frosting. Pudding. Cookies. Cakes other than solo food cake. Candy that has milk chocolate or white chocolate, hydrogenated fat, butter, coconut, or unknown ingredients. Buttered syrups. Full-fat ice cream or ice cream drinks.  Fats and Oils  Gravy that has suet, meat fat, or shortening. Cocoa butter, hydrogenated oils, palm oil, coconut oil, palm kernel oil. These can often be found in baked products, candy, fried foods, nondairy creamers, and whipped toppings. Solid fats and shortenings, including burciaga fat, salt pork, lard, and butter. Nondairy cream substitutes, such as coffee creamers and sour cream substitutes. Salad dressings that are made of unknown oils, cheese, or sour cream.  The items listed  above may not be a complete list of foods and beverages to avoid. Contact your dietitian for more information.     This information is not intended to replace advice given to you by your health care provider. Make sure you discuss any questions you have with your health care provider.     Document Released: 06/18/2013 Document Revised: 01/08/2016 Document Reviewed: 06/11/2015  [employer] Interactive Patient Education ©2016 [employer]            SYMPTOMS OF A STROKE    Call 911 or have someone take you to the Emergency Department if you have any of the following:    · Sudden numbness or weakness of your face, arm or leg especially on one side of the body  · Sudden confusion, diffiiculty speaking or trouble understanding   · Changes in your vision or loss of sight in one eye  · Sudden severe headache with no known cause  · sudden dizziness, trouble walking, loss of balance or coordination    It is important to seek emergency care right away if you have further stroke symptoms. If you get emergency help quickly, the powerful clot-dissolving medicines can reduce the disabilities caused by a stroke.     For more information:    American Stroke Association  9-298-0-STROKE  www.strokeassociation.org           IF YOU SMOKE OR USE TOBACCO PLEASE READ THE FOLLOWING:    Why is smoking bad for me?  Smoking increases the risk of heart disease, lung disease, vascular disease, stroke, and cancer.     If you smoke, STOP!    If you would like more information on quitting smoking, please visit the [place] website: [email or web address]   This link will provide additional resources including the QUIT line and the Beat the Pack support groups.     For more information:    American Cancer Society  ([phone] 736-6603    American Heart Association  1-611.743.5891               YOU ARE THE MOST IMPORTANT FACTOR IN YOUR RECOVERY.     Follow all instructions carefully.     I have reviewed my  discharge instructions with my nurse, including the following information, if applicable:     Information about my illness and diagnosis   Follow up appointments (including lab draws)   Wound Care   Equipment Needs   Medications (new and continuing) along with side effects   Preventative information such as vaccines and smoking cessations   Diet   Pain   I know when to contact my Doctor's office or seek emergency care      I want my nurse to describe the side effects of my medications: YES NO   If the answer is no, I understand the side effects of my medications: YES NO   My nurse described the side effects of my medications in a way that I could understand: YES NO   I have taken my personal belongings and my own medications with me at discharge: YES NO            I have received this information and my questions have been answered. I have discussed any concerns I see with this plan with the nurse or physician. I understand these instructions.    Signature of Patient or Responsible Person: _____________________________________    Date: _________________  Time: __________________    Signature of Healthcare Provider: _______________________________________  Date: _________________  Time: __________________

## 2017-01-02 NOTE — ED PROVIDER NOTES
Subjective   Patient is a 71 y.o. male presenting with abdominal pain.   History provided by:  Patient   used: No    Abdominal Pain   Pain location:  LUQ and LLQ  Pain quality: aching    Pain radiates to:  Does not radiate  Pain severity:  No pain  Onset quality:  Sudden  Duration:  1 day  Timing:  Constant  Progression:  Worsening  Chronicity:  New  Context: not alcohol use, not awakening from sleep, not diet changes, not medication withdrawal, not previous surgeries, not recent illness, not retching, not sick contacts and not suspicious food intake    Relieved by:  Nothing  Worsened by:  Nothing  Ineffective treatments:  None tried  Associated symptoms: diarrhea, nausea and vomiting    Associated symptoms: no anorexia, no chest pain, no constipation, no cough, no dysuria, no fatigue, no fever, no hematemesis, no hematuria, no melena, no shortness of breath, no vaginal bleeding and no vaginal discharge    Risk factors: no alcohol abuse, no aspirin use, not elderly, no NSAID use, not pregnant and no recent hospitalization        Review of Systems   Constitutional: Negative.  Negative for fatigue and fever.   HENT: Negative.    Eyes: Negative.    Respiratory: Negative.  Negative for cough and shortness of breath.    Cardiovascular: Negative.  Negative for chest pain.   Gastrointestinal: Positive for abdominal pain, diarrhea, nausea and vomiting. Negative for anorexia, constipation, hematemesis and melena.   Endocrine: Negative.    Genitourinary: Negative.  Negative for dysuria, hematuria, vaginal bleeding and vaginal discharge.   Musculoskeletal: Negative.    Skin: Negative.    Allergic/Immunologic: Negative.    Neurological: Negative.    Hematological: Negative.    Psychiatric/Behavioral: Negative.        Past Medical History   Diagnosis Date   • A-fib      s/p ablation   • Arthritis    • COPD (chronic obstructive pulmonary disease)    • Diabetes mellitus    • Dyspnea      class 3   • History of  cardiac radiofrequency ablation    • Hypertension    • Neuropathic pain    • Sick sinus syndrome        Allergies   Allergen Reactions   • Handclens 2 In 1 [Benzalkonium Chloride]        Past Surgical History   Procedure Laterality Date   • Pacemaker implantation     • Cholecystectomy     • Carpal tunnel release     • Rotator cuff repair         Family History   Problem Relation Age of Onset   • Heart disease Father    • Heart attack Father    • Heart failure Father        Social History     Social History   • Marital status:      Spouse name: N/A   • Number of children: N/A   • Years of education: N/A     Social History Main Topics   • Smoking status: Never Smoker   • Smokeless tobacco: None   • Alcohol use No   • Drug use: No   • Sexual activity: Defer     Other Topics Concern   • None     Social History Narrative   • None           Objective   Physical Exam   Constitutional: He is oriented to person, place, and time. He appears well-developed and well-nourished.   HENT:   Head: Normocephalic.   Right Ear: External ear normal.   Left Ear: External ear normal.   Mouth/Throat: Oropharynx is clear and moist.   Eyes: Conjunctivae and EOM are normal. Pupils are equal, round, and reactive to light.   Neck: Normal range of motion. Neck supple.   Cardiovascular: Normal rate and regular rhythm.    Pulmonary/Chest: Effort normal and breath sounds normal.   Abdominal: Soft. Bowel sounds are normal. There is tenderness in the left upper quadrant and left lower quadrant.   Musculoskeletal: Normal range of motion.   Neurological: He is alert and oriented to person, place, and time. He has normal reflexes.   Skin: Skin is warm and dry.   Psychiatric: He has a normal mood and affect. His behavior is normal.   Nursing note and vitals reviewed.      Procedures         ED Course  ED Course                  MDM    Final diagnoses:   Small bowel obstruction            Vasile Turk, APRN  01/02/17 2144

## 2017-01-03 LAB
ALBUMIN SERPL-MCNC: 3.5 G/DL (ref 3.4–4.8)
ALBUMIN/GLOB SERPL: 1.3 G/DL (ref 1.5–2.5)
ALP SERPL-CCNC: 33 U/L (ref 46–116)
ALT SERPL W P-5'-P-CCNC: 38 U/L (ref 10–44)
ANION GAP SERPL CALCULATED.3IONS-SCNC: 5.4 MMOL/L (ref 3.6–11.2)
AST SERPL-CCNC: 39 U/L (ref 10–34)
BASOPHILS # BLD AUTO: 0.01 10*3/MM3 (ref 0–0.3)
BASOPHILS NFR BLD AUTO: 0.2 % (ref 0–2)
BILIRUB SERPL-MCNC: 0.8 MG/DL (ref 0.2–1.8)
BUN BLD-MCNC: 12 MG/DL (ref 7–21)
BUN/CREAT SERPL: 12.8 (ref 7–25)
CALCIUM SPEC-SCNC: 8.8 MG/DL (ref 7.7–10)
CHLORIDE SERPL-SCNC: 111 MMOL/L (ref 99–112)
CO2 SERPL-SCNC: 25.6 MMOL/L (ref 24.3–31.9)
CREAT BLD-MCNC: 0.94 MG/DL (ref 0.43–1.29)
CRP SERPL-MCNC: 3.76 MG/DL (ref 0–0.99)
DEPRECATED RDW RBC AUTO: 48.9 FL (ref 37–54)
EOSINOPHIL # BLD AUTO: 0.1 10*3/MM3 (ref 0–0.7)
EOSINOPHIL NFR BLD AUTO: 1.8 % (ref 0–7)
ERYTHROCYTE [DISTWIDTH] IN BLOOD BY AUTOMATED COUNT: 14.4 % (ref 11.5–14.5)
GFR SERPL CREATININE-BSD FRML MDRD: 79 ML/MIN/1.73
GLOBULIN UR ELPH-MCNC: 2.6 GM/DL
GLUCOSE BLD-MCNC: 125 MG/DL (ref 70–110)
GLUCOSE BLDC GLUCOMTR-MCNC: 115 MG/DL (ref 70–130)
GLUCOSE BLDC GLUCOMTR-MCNC: 116 MG/DL (ref 70–130)
GLUCOSE BLDC GLUCOMTR-MCNC: 122 MG/DL (ref 70–130)
GLUCOSE BLDC GLUCOMTR-MCNC: 135 MG/DL (ref 70–130)
HCT VFR BLD AUTO: 45.8 % (ref 42–52)
HGB BLD-MCNC: 14.7 G/DL (ref 14–18)
IMM GRANULOCYTES # BLD: 0.02 10*3/MM3 (ref 0–0.03)
IMM GRANULOCYTES NFR BLD: 0.4 % (ref 0–0.5)
LYMPHOCYTES # BLD AUTO: 2.19 10*3/MM3 (ref 1–3)
LYMPHOCYTES NFR BLD AUTO: 38.7 % (ref 16–46)
MCH RBC QN AUTO: 30 PG (ref 27–33)
MCHC RBC AUTO-ENTMCNC: 32.1 G/DL (ref 33–37)
MCV RBC AUTO: 93.5 FL (ref 80–94)
MONOCYTES # BLD AUTO: 0.64 10*3/MM3 (ref 0.1–0.9)
MONOCYTES NFR BLD AUTO: 11.3 % (ref 0–12)
NEUTROPHILS # BLD AUTO: 2.7 10*3/MM3 (ref 1.4–6.5)
NEUTROPHILS NFR BLD AUTO: 47.6 % (ref 40–75)
OSMOLALITY SERPL CALC.SUM OF ELEC: 284.4 MOSM/KG (ref 273–305)
PLATELET # BLD AUTO: 88 10*3/MM3 (ref 130–400)
PMV BLD AUTO: 11.1 FL (ref 6–10)
POTASSIUM BLD-SCNC: 3.6 MMOL/L (ref 3.5–5.3)
PROT SERPL-MCNC: 6.1 G/DL (ref 6–8)
RBC # BLD AUTO: 4.9 10*6/MM3 (ref 4.7–6.1)
SODIUM BLD-SCNC: 142 MMOL/L (ref 135–153)
WBC NRBC COR # BLD: 5.66 10*3/MM3 (ref 4.5–12.5)

## 2017-01-03 PROCEDURE — 94799 UNLISTED PULMONARY SVC/PX: CPT

## 2017-01-03 PROCEDURE — 86140 C-REACTIVE PROTEIN: CPT | Performed by: INTERNAL MEDICINE

## 2017-01-03 PROCEDURE — 25010000002 HYDROMORPHONE PER 4 MG: Performed by: INTERNAL MEDICINE

## 2017-01-03 PROCEDURE — 25010000002 HYDROCORTISONE SODIUM SUCCINATE 100 MG RECONSTITUTED SOLUTION: Performed by: INTERNAL MEDICINE

## 2017-01-03 PROCEDURE — 93005 ELECTROCARDIOGRAM TRACING: CPT | Performed by: INTERNAL MEDICINE

## 2017-01-03 PROCEDURE — 85025 COMPLETE CBC W/AUTO DIFF WBC: CPT | Performed by: INTERNAL MEDICINE

## 2017-01-03 PROCEDURE — 80053 COMPREHEN METABOLIC PANEL: CPT | Performed by: INTERNAL MEDICINE

## 2017-01-03 PROCEDURE — 82962 GLUCOSE BLOOD TEST: CPT

## 2017-01-03 PROCEDURE — 25010000002 PROMETHAZINE PER 50 MG: Performed by: INTERNAL MEDICINE

## 2017-01-03 PROCEDURE — 93010 ELECTROCARDIOGRAM REPORT: CPT | Performed by: INTERNAL MEDICINE

## 2017-01-03 PROCEDURE — 99233 SBSQ HOSP IP/OBS HIGH 50: CPT | Performed by: INTERNAL MEDICINE

## 2017-01-03 RX ORDER — ZOLPIDEM TARTRATE 5 MG/1
5 TABLET ORAL NIGHTLY PRN
Status: DISCONTINUED | OUTPATIENT
Start: 2017-01-03 | End: 2017-01-04 | Stop reason: HOSPADM

## 2017-01-03 RX ORDER — SODIUM CHLORIDE 9 MG/ML
INJECTION, SOLUTION INTRAVENOUS
Status: DISPENSED
Start: 2017-01-03 | End: 2017-01-04

## 2017-01-03 RX ORDER — SODIUM CHLORIDE 9 MG/ML
INJECTION, SOLUTION INTRAVENOUS
Status: DISPENSED
Start: 2017-01-03 | End: 2017-01-03

## 2017-01-03 RX ADMIN — HYDROMORPHONE HYDROCHLORIDE 0.5 MG: 1 INJECTION, SOLUTION INTRAMUSCULAR; INTRAVENOUS; SUBCUTANEOUS at 05:43

## 2017-01-03 RX ADMIN — HYDROMORPHONE HYDROCHLORIDE 0.5 MG: 1 INJECTION, SOLUTION INTRAMUSCULAR; INTRAVENOUS; SUBCUTANEOUS at 16:45

## 2017-01-03 RX ADMIN — HYDROCORTISONE SODIUM SUCCINATE 25 MG: 100 INJECTION, POWDER, FOR SOLUTION INTRAMUSCULAR; INTRAVENOUS at 11:39

## 2017-01-03 RX ADMIN — PROMETHAZINE HYDROCHLORIDE 12.5 MG: 25 INJECTION INTRAMUSCULAR; INTRAVENOUS at 16:45

## 2017-01-03 RX ADMIN — HYDROCORTISONE SODIUM SUCCINATE 25 MG: 100 INJECTION, POWDER, FOR SOLUTION INTRAMUSCULAR; INTRAVENOUS at 00:51

## 2017-01-03 RX ADMIN — PANTOPRAZOLE SODIUM 40 MG: 40 INJECTION, POWDER, FOR SOLUTION INTRAVENOUS at 05:35

## 2017-01-03 RX ADMIN — SODIUM CHLORIDE 100 ML/HR: 9 INJECTION, SOLUTION INTRAVENOUS at 02:10

## 2017-01-03 RX ADMIN — HYDROMORPHONE HYDROCHLORIDE 0.5 MG: 1 INJECTION, SOLUTION INTRAMUSCULAR; INTRAVENOUS; SUBCUTANEOUS at 11:46

## 2017-01-03 RX ADMIN — PROMETHAZINE HYDROCHLORIDE 12.5 MG: 25 INJECTION INTRAMUSCULAR; INTRAVENOUS at 08:33

## 2017-01-03 RX ADMIN — SODIUM CHLORIDE 100 ML/HR: 9 INJECTION, SOLUTION INTRAVENOUS at 05:43

## 2017-01-03 RX ADMIN — SODIUM CHLORIDE 50 ML/HR: 9 INJECTION, SOLUTION INTRAVENOUS at 16:45

## 2017-01-03 NOTE — PLAN OF CARE
Problem: Patient Care Overview (Adult)  Goal: Plan of Care Review  Outcome: Ongoing (interventions implemented as appropriate)  Goal: Discharge Needs Assessment    01/03/17 1017   Discharge Needs Assessment   Concerns To Be Addressed no discharge needs identified         Problem: Bowel Obstruction (Adult)  Goal: Signs and Symptoms of Listed Potential Problems Will be Absent or Manageable (Bowel Obstruction)  Outcome: Ongoing (interventions implemented as appropriate)    01/03/17 1017   Bowel Obstruction   Problems Assessed (Bowel Obstruction) nausea and vomiting   Problems Present (Bowel Obstruction) nausea and vomiting   No vomiting at this time    Problem: Fall Risk (Adult)  Goal: Identify Related Risk Factors and Signs and Symptoms  Outcome: Ongoing (interventions implemented as appropriate)    01/03/17 1017   Fall Risk   Fall Risk: Related Risk Factors age-related changes;other (see comments)--patient is independent, with occasional nausea    Fall Risk: Signs and Symptoms presence of risk factors         Problem: Diabetes, Type 2 (Adult)  Goal: Signs and Symptoms of Listed Potential Problems Will be Absent or Manageable (Diabetes, Type 2)  Outcome: Ongoing (interventions implemented as appropriate)    01/03/17 1017   Diabetes, Type 2   Problems Assessed (Type 2 Diabetes) impaired glycemic control-will monitor blood glucose   Problems Present (Type 2 Diabetes) none

## 2017-01-03 NOTE — PROGRESS NOTES
Discharge Planning Assessment   [facility]     Patient Name: Moe Bridges  MRN: [id number]  Today's Date: 1/3/2017    Admit Date: 1/2/2017 01/03/17 1339    Case Management/Social Work Plan    Plan SS received consult evaluation. SS spoke to pt and spouse who state no needs at this time. CM following.      Diamond Kay

## 2017-01-03 NOTE — PROGRESS NOTES
Discharge Planning Assessment   [facility]     Patient Name: Moe Bridges  MRN: [id number]  Today's Date: 1/3/2017    Admit Date: 1/2/2017          Discharge Needs Assessment       01/03/17 1110    Living Environment    Lives With spouse   Lives at home with spouse, Em.    Living Arrangements house    Provides Primary Care For no one    Quality Of Family Relationships supportive;involved;helpful   Spouse present at bedside.    Able to Return to Prior Living Arrangements yes    Discharge Needs Assessment    Concerns To Be Addressed no discharge needs identified    Readmission Within The Last 30 Days no previous admission in last 30 days    Equipment Currently Used at Home walker, rolling;other (see comments)   Has a rolling walker for prn use supplied per [facility] and a stick cane.    Equipment Needed After Discharge none    Transportation Available car   Family will provide discharge transportation.    Discharge Disposition home or self-care   Plans to return home at discharge.            Discharge Plan       01/03/17 1114    Case Management/Social Work Plan    Plan Plans to return home at discharge with spouse support.    Patient/Family In Agreement With Plan yes    Additional Comments Came to ED from home with c/o abdominal pain, nausea and vomiting. Admitted with Dx: Small bowel obstruction. NPO. Receiving IV fluids. Surgery consult pending. Platelets  88 today. Spouse present at bedside. No CM needs identified at this time. Will follow and assist as needed.        Discharge Placement     No information found                Demographic Summary       01/03/17 1105    Referral Information    Admission Type inpatient    Arrived From admitted as an inpatient    Referral Source admission list    Reason For Consult discharge planning    Record Reviewed medical record    Primary Care Physician Information    Name Chris Haji MD-PCP.            Functional Status       01/03/17 1106    Functional Status Current     Ambulation 0-->independent   States he has been up ambulatory to restroom.    Transferring 0-->independent    Toileting 0-->independent    Bathing 0-->independent    Dressing 0-->independent    Eating 0-->independent    Communication 0-->understands/communicates without difficulty    Swallowing (if score 2 or more for any item, consult Rehab Services) 0-->swallows foods/liquids without difficulty    Change in Functional Status Since Onset of Current Illness/Injury yes    Functional Status Prior    Ambulation 0-->independent    Transferring 0-->independent    Toileting 0-->independent    Bathing 0-->independent    Dressing 0-->independent    Eating 0-->independent    Communication 0-->understands/communicates without difficulty    Swallowing 0-->swallows foods/liquids without difficulty    Activity Tolerance    Current Activity Limitations bed rest    Usual Activity Tolerance good    Current Activity Tolerance moderate    Cognitive/Perceptual/Developmental    Current Mental Status/Cognitive Functioning no deficits noted   Alert and oriented.    Recent Changes in Mental Status/Cognitive Functioning no changes    Employment/Financial    Financial Concerns --   Medicare [employer] B and [facility] Medicare Supplement is coverage source. Obtains medications at [facility], [facility] and [facility]. States he has issues getting his medications at times. Spouse states  [facility] helps them out. Declines Pharmacy and financial counselling consults.            Psychosocial     None            Abuse/Neglect     None            Legal       01/03/17 1111    Legal    Assistance with Managing/Advocating Healthcare Needs --   States he has a Living will on file. He does not have a POA.            Substance Abuse     None            Patient Forms     None          Guillermina Sexton RN

## 2017-01-03 NOTE — NURSING NOTE
[facility] ADMISSION NOTE:  Patient enrolled in the [facility] program to assist with education and support during transition home from hospital. [facility] home  will see patient at home within 48 hours of discharge and will follow up with patient in home and telephonically for 6 weeks post discharge under the [facility]i Model.    Clinical Assessment Instrument Scores:  Short Portable Mental Status Questionnaire- 9  Geriatric Depression Scale-0  Instrumental Activities of Daily Living-7  SANCHEZ Activities of Daily Living-6  Overall Quality of Life- good  Subjective Health Rating- poor  Symptom Bother Scale-11  Generalized Anxiety Scale-3  Health Literacy Test- 7

## 2017-01-03 NOTE — PLAN OF CARE
Problem: Patient Care Overview (Adult)  Goal: Plan of Care Review  Outcome: Ongoing (interventions implemented as appropriate)  Goal: Adult Individualization and Mutuality  Outcome: Ongoing (interventions implemented as appropriate)    Problem: Bowel Obstruction (Adult)  Goal: Signs and Symptoms of Listed Potential Problems Will be Absent or Manageable (Bowel Obstruction)  Outcome: Ongoing (interventions implemented as appropriate)

## 2017-01-03 NOTE — H&P
"Hospitalist History and Physical    Patient Identification:  Name: Moe Bridges  Age/Sex: 71 y.o. male  :  1945  MRN: [id number]         Primary Care Physician: Chris Haji MD    Chief Complaint   Patient presents with   • Abdominal Pain     pt reports n/v/d x 2 days        History of Present Illness  Patient is a 71 y.o. male presents with the following: Nausea, vomiting, diarrhea and abdominal pain    The patient is a very pleasant 71-year-old male with past medical history significant for atrial fibrillation status post ablation, sick sinus syndrome status post permanent pacemaker placement in , essential hypertension and non-insulin-dependent diabetes mellitus type 2 who presents to the emergency department with the above-mentioned complaints.    The patient states that shortly after dinner on Saturday evening he experienced abdominal bloating with nausea, vomiting and watery diarrhea.  The patient states that he took over-the-counter Imodium as well as a \"nausea pill\" with no improvement in his symptoms.  The patient states that his abdominal discomfort is \"hard to explain\" but is similar to a crampy, sharp pain that is mostly located in the left lower quadrant.  He denies recent fever.  He reports chronic chills.  He denies known sick contacts.  He reports that approximately one year ago he experienced a similar pain in his abdomen but states that it was very short-lived.  He states that he only had a \"twinge\" of pain at that time.    Otherwise, the patient states that he has felt well.  He denies any recent chest pain, dyspnea and/or palpitations.  The patient states that he has severe peripheral neuropathy that has improved with Lyrica.  The patient states that the neurologist in formerly [facility] started him on Lyrica approximately 1-1/2 years ago.    At this time, the patient complains of some mild nausea but denies any vomiting and or significant abdominal pain.  The patient " states that his last episode of vomiting was approximately 12 hours ago.    He states that his last colonoscopy was approximately 7-8 years ago and states that to his knowledge it was completely normal.    Present during visit: BRAYDEN Baez    Past History:  Past Medical History   Diagnosis Date   • A-fib      s/p ablation 2007   • Arthritis    • COPD (chronic obstructive pulmonary disease)    • Diabetes mellitus, non-insulin dependent     Dysequilibrium occurring 30 years ago and only responding to prednisone     Chronic steroid use    • Dyspnea    • Essential Hypertension    • Neuropathic pain    • Sick sinus syndrome status post permanent pacemaker placement      Past Surgical History   Procedure Laterality Date   • Pacemaker implantation Left chest 2010   • Cholecystectomy     • Carpal tunnel release     • Rotator cuff repair, right x3       Family History   Problem Relation Age of Onset   • Heart disease Father    • Heart attack Father    • Heart failure Father      Social History   Substance Use Topics   • Smoking status: Never Smoker   • Smokeless tobacco: None   • Alcohol use No     Prescriptions Prior to Admission   Medication Sig Dispense Refill Last Dose   • aspirin 81 MG chewable tablet Chew 81 mg Daily.   12/31/2016   • chlorzoxazone (PARAFON FORTE) 500 MG tablet Take 500 mg by mouth 4 (Four) Times a Day As Needed for muscle spasms.   12/31/2016   • citalopram (CeleXA) 40 MG tablet Take 40 mg by mouth Daily.   12/31/2016   • furosemide (LASIX) 80 MG tablet Take 80 mg by mouth Daily.   12/31/2016   • glyBURIDE (DIAbeta) 5 MG tablet Take 10 mg by mouth 2 (Two) Times a Day.   12/31/2016   • lisinopril (PRINIVIL,ZESTRIL) 2.5 MG tablet Take 2.5 mg by mouth Daily.   12/31/2016   • LORazepam (ATIVAN) 0.5 MG tablet Take 0.5 mg by mouth Every 8 (Eight) Hours As Needed for anxiety.   12/31/2016   • nortriptyline (PAMELOR) 50 MG capsule Take 50 mg by mouth Every Night.   12/31/2016   • Omega-3 Fatty Acids (FISH OIL)  1000 MG capsule capsule Take 2,000 mg by mouth 2 (Two) Times a Day With Meals.   12/31/2016   • oxyCODONE (oxyCONTIN) 10 MG 12 hr tablet Take 10 mg by mouth 3 (Three) Times a Day As Needed for severe pain (7-10).   12/31/2016   • pioglitazone (ACTOS) 30 MG tablet Take 30 mg by mouth Daily.   12/31/2016   • potassium chloride (K-DUR) 10 MEQ CR tablet Take 10 mEq by mouth Every Morning.   12/31/2016   • predniSONE (DELTASONE) 10 MG tablet Take 10 mg by mouth Every Morning.   12/31/2016   • pregabalin (LYRICA) 100 MG capsule Take 100 mg by mouth 3 (Three) Times a Day.   12/31/2016   • zolpidem (AMBIEN) 10 MG tablet Take 10 mg by mouth Every Night.   12/31/2016     Allergies: Handclens 2 in 1 [benzalkonium chloride]    Review of Systems:  Review of Systems   Constitutional: Positive for chills. Negative for diaphoresis, fever and unexpected weight change.   HENT: Negative for hearing loss, tinnitus and trouble swallowing.    Eyes: Negative for photophobia, discharge and visual disturbance.   Respiratory: Negative for cough, shortness of breath and wheezing.    Cardiovascular: Negative for chest pain, palpitations and leg swelling.   Gastrointestinal: Positive for abdominal distention, abdominal pain, diarrhea, nausea and vomiting. Negative for blood in stool and constipation.   Endocrine: Negative for polydipsia, polyphagia and polyuria.   Genitourinary: Negative for dysuria, frequency and hematuria.   Musculoskeletal: Positive for myalgias. Negative for gait problem and neck pain.   Skin: Negative for rash.   Neurological: Negative for tremors, seizures, syncope, weakness and light-headedness.   Hematological: Does not bruise/bleed easily.   Psychiatric/Behavioral: Negative for confusion, hallucinations and suicidal ideas.        Vital Signs  Temp:  [97.6 °F (36.4 °C)-98.8 °F (37.1 °C)] 97.6 °F (36.4 °C)  Heart Rate:  [65-69] 69  Resp:  [16-20] 18  BP: (143-171)/(80-83) 171/82  Body mass index is 35.48  kg/(m^2).    Physical Exam:  Physical Exam   Constitutional: He is oriented to person, place, and time. He appears well-developed and well-nourished. He does not appear ill. No distress.   Overweight.  Pleasant.   HENT:   Head: Normocephalic and atraumatic.   Nose: Nose normal.   Mouth/Throat: Oropharynx is clear and moist and mucous membranes are normal.   Eyes: Conjunctivae and EOM are normal. Pupils are equal, round, and reactive to light. No scleral icterus.   Neck: Trachea normal. Neck supple. No JVD present. Carotid bruit is not present. No thyromegaly present.   Cardiovascular: Normal rate, regular rhythm, normal heart sounds and normal pulses.  Exam reveals no gallop and no friction rub.    No murmur heard.  No significant lower extremity edema is noted.  The patient has a pacemaker in the left chest.   Pulmonary/Chest: Effort normal. No respiratory distress. He has no wheezes. He has no rales.   Abdominal: Soft. He exhibits no distension. There is tenderness in the left lower quadrant. There is no guarding. A hernia is present. Hernia confirmed positive in the ventral area.   The patient has bowel sounds auscultated in all 4 quadrants.  He has mild tenderness in the left lower quadrant.  He has a small, rounded protuberance noted in the left mid to left lower quadrant region that appears superficial and is not painful.   Musculoskeletal: Normal range of motion.   Neurological: He is alert and oriented to person, place, and time. He has normal strength. No cranial nerve deficit.   Skin: Skin is warm, dry and intact. No rash noted. No erythema.   Mild hyperpigmentation noted on the bilateral lower extremities   Psychiatric: He has a normal mood and affect. His speech is normal.       Results Review:            Results from last 7 days  Lab Units 01/02/17  1321   WBC 10*3/mm3 7.43   HEMOGLOBIN g/dL 15.8   HEMATOCRIT % 47.8   PLATELETS 10*3/mm3 100*       Results from last 7 days  Lab Units 01/02/17  1321    SODIUM mmol/L 139   POTASSIUM mmol/L 3.6   CHLORIDE mmol/L 106   TOTAL CO2 mmol/L 27.0   BUN mg/dL 15   CREATININE mg/dL 1.02   CALCIUM mg/dL 9.2   GLUCOSE mg/dL 172*       Results from last 7 days  Lab Units 01/02/17  1321   BILIRUBIN mg/dL 1.0   ALK PHOS U/L 37*   AST (SGOT) U/L 36*   ALT (SGPT) U/L 39       Results from last 7 days  Lab Units 01/02/17  2102   CRP mg/dL 4.48*       Imaging:    I have personally reviewed the EKG. No comparison EKGs in our system        Imaging Results (most recent)          Assessment/Plan     1.  Left lower quadrant abdominal pain with nausea and vomiting that is probably due to a small bowel obstruction of unclear etiology; differential diagnosis to include adhesions versus due to inflammation and or masses or hernias or other  2.  Mild thrombocytopenia that may be medication induced (ie, lyrica)  3.  Essential hypertension that is currently uncontrolled  4.  Non-insulin-dependent diabetes mellitus type 2   5.  Minimally elevated AST and ALT that may be medication induced versus secondary to fatty liver infiltration versus other  6.  First-degree AV block  7.  History of atrial fibrillation status post ablation  8.  Sick sinus syndrome status post permanent pacemaker placement  9.  Peripheral neuropathy with chronic pain  10. Chronic obstructive pulmonary disease not in an acute exacerbation  11. Upper limit of normal QTC  12. Generalized anxiety disorder  13. Chronic steroid use    The patient has been admitted to the medical surgical floor with telemetry.  He has been made nothing by mouth and is receiving gentle IV fluid hydration with as needed analgesics and anti-emetics (phenergan rather than Zofran due to borderline QTc).  I will begin him on low-dose IV hydrocortisone for now as the patient has been taking prednisone for at least 30 years and I am concerned that discontinuing his prednisone may cause an and renal crisis.  Plan to hold on nasogastric tube insertion at  this time as the patient is symptomatically improved with no significant nausea and no vomiting at this time.  A general surgery consultation has been placed. Will plan to hold all by mouth medications at this time.      I will begin the patient on an as needed IV antihypertensive and will make his Ativan IV as needed as well.  I will place him on Accu-Cheks.  His CRP level is noted to be mildly elevated.  I plan to repeat his labs in the morning. Follow up on the official radiology read of his CT. Repeat his EKG in the am. Further management is pending the patient's clinical course as well as surgical evaluation.    DVT/GI prophylaxis: SCDs (no heparin products due to thrombocytopenia); IV PPI    Estimated Length of Stay: < 2 MNs    I discussed the patients findings and my recommendations with patient and nursing staff      Maryanne Sanders DO  01/02/17  10:13 PM

## 2017-01-03 NOTE — NURSING NOTE
Upon patients skin assessment discoloration noted on bilateral lower extremities, darkened spot on left great toe patient states he currently is seeing a podiatrist for this. Heels are red/intact/blanchable. Periarea is excoriated, CNA cleansed patient and applied barrier cream, generalized bruising also noted.   BRAYDEN Baez.

## 2017-01-03 NOTE — PROGRESS NOTES
[facility]     Patient Identification:  Name:  Moe Bridges  Age:  71 y.o.  Sex:  male  :  1945  MRN:  [id number]  Visit Number:  [id number]  Primary Care Provider:  Chris Haji MD    Length of stay:  0    Subjective:  71-year-old  male admitted on 2017 by Dr. Sanders with abdominal pain and distention; CT scan showed a partial small bowel obstruction.  Today, the patient feels better.  He does not have nausea and is now passing flatus with no stool production.  He has less abdominal distention and does not have abdominal pain now.  His hernia has not changed.  He denies chest pain and denies trouble breathing.  He denies fevers and denies chills.  ----------------------------------------------------------------------------------------------------------------------  \[facility]\"" Meds:  hydrocortisone sodium succinate 25 mg Intravenous Q12H   pantoprazole 40 mg Intravenous Q AM   sodium chloride        sodium chloride 50 mL/hr Last Rate: 100 mL/hr (17 0543)   ----------------------------------------------------------------------------------------------------------------------  Vital Signs:  Temp:  [97.6 °F (36.4 °C)-99 °F (37.2 °C)] 98.4 °F (36.9 °C)  Heart Rate:  [62-69] 65  Resp:  [16-18] 18  BP: (125-171)/(74-87) 152/87  Last 3 weights    17  1232 17  2030   Weight: 250 lb (113 kg) 247 lb 4 oz (112 kg)     Body mass index is 35.48 kg/(m^2).        Diet Clear Liquid  ----------------------------------------------------------------------------------------------------------------------  Physical exam:  Constitutional:  Well-developed and well-nourished.  No respiratory distress.      HENT:  Head:  Normocephalic and atraumatic.  Mouth:  Moist mucous membranes.    Eyes:  Conjunctivae and EOM are normal.  Pupils are equal, round, and reactive to light.  No scleral icterus.    Neck:  Neck supple.  No JVD present.     Cardiovascular:  Normal rate, regular rhythm and normal heart sounds with no murmur.  Pulmonary/Chest:  No respiratory distress, no wheezes, no crackles, with normal breath sounds and good air movement.  Abdominal:  Soft despite being slightly distended.  Bowel sounds are hypoactive.  Minimal tenderness to palpation.  He has a ventral hernia above his umbilicus that is easily reduced and nontender with no skin changes.    Musculoskeletal:  No edema, no tenderness, and no deformity.  No red or swollen joints anywhere.    Neurological:  Alert and oriented to person, place, and time.  No cranial nerve deficit.  No tongue deviation.  No facial droop.  No slurred speech.   Skin:  Skin is warm and dry. No rash noted. No pallor.   Peripheral vascular:  Strong pulses in all 4 extremities with no clubbing, no cyanosis, no edema.  Genitourinary:  No Parada catheter in place.  ----------------------------------------------------------------------------------------------------------------------  Tele:  paste in in the 60; I personally reviewed the telemetry strips.  ----------------------------------------------------------------------------------------------------------------------  Lab Results   Component Value Date    CKTOTAL 285 (H) 04/14/2014     Results from last 7 days  Lab Units 01/03/17  0105 01/02/17  2102 01/02/17  1321   CRP mg/dL 3.76* 4.48*  --    WBC 10*3/mm3 5.66  --  7.43   HEMOGLOBIN g/dL 14.7  --  15.8   HEMATOCRIT % 45.8  --  47.8   MCV fL 93.5  --  92.1   MCHC g/dL 32.1*  --  33.1   PLATELETS 10*3/mm3 88*  --  100*     Results from last 7 days  Lab Units 01/03/17  0105 01/02/17  1321   SODIUM mmol/L 142 139   POTASSIUM mmol/L 3.6 3.6   CHLORIDE mmol/L 111 106   TOTAL CO2 mmol/L 25.6 27.0   BUN mg/dL 12 15   CREATININE mg/dL 0.94 1.02   EGFR IF NONAFRICN AM mL/min/1.73 79 72   CALCIUM mg/dL 8.8 9.2   GLUCOSE mg/dL 125* 172*   ALBUMIN g/dL 3.50 4.00   BILIRUBIN mg/dL 0.8 1.0   ALK PHOS U/L 33* 37*   AST  (SGOT) U/L 39* 36*   ALT (SGPT) U/L 38 39   Estimated Creatinine Clearance: 89 mL/min (by C-G formula based on Cr of 0.94).  ----------------------------------------------------------------------------------------------------------------------  Imaging Results (last 24 hours)     Procedure Component Value Units Date/Time    CT Abdomen Pelvis With Contrast [[id number]] Collected:  01/03/17 0653     Updated:  01/03/17 0906    Findings  LOWER THORAX: Clear. No effusions.  ABDOMEN:     LIVER: FATTY LIVER INFILTRATION.     GALLBLADDER: CHOLECYSTECTOMY CLIPS.     PANCREAS: Unremarkable. No mass or ductal dilatation.     SPLEEN: Homogeneous. No splenomegaly.     ADRENALS: No mass.     KIDNEYS: No mass. No obstructive uropathy.  No evidence of urolithiasis.     GI TRACT: ABUNDANT FLUID WITHIN THE COLON AND DISTAL SMALL BOWEL PROBABLY REFLECTIVE OF ENTERITIS OR DIARRHEAL ILLNESS.     PERITONEUM: No free air. No free fluid or loculated fluid collections.     MESENTERY: Unremarkable.     LYMPH NODES: No lymphadenopathy.     VASCULATURE: No evidence of aneurysm.     ABDOMINAL WALL: No focal hernia or mass.   PELVIS:     BLADDER: No focal mass or significant wall thickening     REPRODUCTIVE: Unremarkable as visualized.     APPENDIX: Nondistended. No surrounding inflammation.  BONES: No acute bony abnormality.    Impression:       Impression:  Abundant fluid within the colon and distal small bowel probably  reflective of enteritis or diarrheal illness. Also based on the  appearance of some distal decompression, partial obstruction may be  Considered.  This report was finalized on 1/3/2017 6:55 AM by Dr. Aden Garcia MD.   I have personally looked at the radiology images and read the final radiology report.    ----------------------------------------------------------------------------------------------------------------------  Assessment and Plan:  -Small bowel obstruction that is improving and was causing left lower quadrant  abdominal pain with the small bowel obstruction probably being brought on by an acute viral enteritis  -Mild thrombocytopenia that may be the result of Lyrica use  -Minimally elevated AST and ALT that may either be medication induced versus segment to fatty liver disease versus another cause  -COPD with no acute exacerbation  -Type 2 diabetes mellitus, non-insulin-dependent  -Disequilibrium for the last 30 years with oral prednisone for that time  -Essential hypertension  -A. fib status post ablation in 2007 now with sick sinus syndrome and permanent pacemaker placement  -Morbid obesity  -QTc slightly prolonged (477 ms)  -Generalized anxiety disorder  -Peripheral neuropathy with chronic pain    Since the patient is doing better, I will start him on clear liquids and advance his diet slowly as tolerated.  He is passing flatus so stool production should occur if the small bowel obstruction is in fact resolving.  I will repeat his blood work in the morning.  His blood pressures are controlled at this time and he is paced.  He cannot have any DVT prophylaxis right now because of from cytopenia.  Once the patient is able to tolerate orals, we will change his medication from home back to oral formulation.  I will decrease his IV fluids to 50 mL per hour.    The patient is high risk due to the following diagnoses/reasons:  small bowel obstruction    Alina Hart MD  01/03/17  2:46 PM

## 2017-01-04 VITALS
WEIGHT: 247.25 LBS | HEART RATE: 62 BPM | SYSTOLIC BLOOD PRESSURE: 148 MMHG | TEMPERATURE: 98.7 F | BODY MASS INDEX: 35.4 KG/M2 | OXYGEN SATURATION: 93 % | DIASTOLIC BLOOD PRESSURE: 77 MMHG | RESPIRATION RATE: 20 BRPM | HEIGHT: 70 IN

## 2017-01-04 LAB
ALBUMIN SERPL-MCNC: 3.4 G/DL (ref 3.4–4.8)
ALBUMIN/GLOB SERPL: 1.4 G/DL (ref 1.5–2.5)
ALP SERPL-CCNC: 33 U/L (ref 46–116)
ALT SERPL W P-5'-P-CCNC: 36 U/L (ref 10–44)
ANION GAP SERPL CALCULATED.3IONS-SCNC: 2.8 MMOL/L (ref 3.6–11.2)
AST SERPL-CCNC: 33 U/L (ref 10–34)
BASOPHILS # BLD AUTO: 0.01 10*3/MM3 (ref 0–0.3)
BASOPHILS NFR BLD AUTO: 0.1 % (ref 0–2)
BILIRUB SERPL-MCNC: 0.7 MG/DL (ref 0.2–1.8)
BUN BLD-MCNC: 12 MG/DL (ref 7–21)
BUN/CREAT SERPL: 12 (ref 7–25)
CALCIUM SPEC-SCNC: 8.6 MG/DL (ref 7.7–10)
CHLORIDE SERPL-SCNC: 112 MMOL/L (ref 99–112)
CO2 SERPL-SCNC: 27.2 MMOL/L (ref 24.3–31.9)
CREAT BLD-MCNC: 1 MG/DL (ref 0.43–1.29)
CRP SERPL-MCNC: 1.4 MG/DL (ref 0–0.99)
DEPRECATED RDW RBC AUTO: 48.2 FL (ref 37–54)
EOSINOPHIL # BLD AUTO: 0.17 10*3/MM3 (ref 0–0.7)
EOSINOPHIL NFR BLD AUTO: 2.4 % (ref 0–7)
ERYTHROCYTE [DISTWIDTH] IN BLOOD BY AUTOMATED COUNT: 14.6 % (ref 11.5–14.5)
GFR SERPL CREATININE-BSD FRML MDRD: 73 ML/MIN/1.73
GLOBULIN UR ELPH-MCNC: 2.4 GM/DL
GLUCOSE BLD-MCNC: 128 MG/DL (ref 70–110)
GLUCOSE BLDC GLUCOMTR-MCNC: 121 MG/DL (ref 70–130)
GLUCOSE BLDC GLUCOMTR-MCNC: 129 MG/DL (ref 70–130)
GLUCOSE BLDC GLUCOMTR-MCNC: 137 MG/DL (ref 70–130)
GLUCOSE BLDC GLUCOMTR-MCNC: 146 MG/DL (ref 70–130)
HCT VFR BLD AUTO: 44.2 % (ref 42–52)
HGB BLD-MCNC: 14.2 G/DL (ref 14–18)
IMM GRANULOCYTES # BLD: 0.01 10*3/MM3 (ref 0–0.03)
IMM GRANULOCYTES NFR BLD: 0.1 % (ref 0–0.5)
LYMPHOCYTES # BLD AUTO: 2.91 10*3/MM3 (ref 1–3)
LYMPHOCYTES NFR BLD AUTO: 40.8 % (ref 16–46)
MAGNESIUM SERPL-MCNC: 2 MG/DL (ref 1.7–2.6)
MCH RBC QN AUTO: 30 PG (ref 27–33)
MCHC RBC AUTO-ENTMCNC: 32.1 G/DL (ref 33–37)
MCV RBC AUTO: 93.2 FL (ref 80–94)
MONOCYTES # BLD AUTO: 0.43 10*3/MM3 (ref 0.1–0.9)
MONOCYTES NFR BLD AUTO: 6 % (ref 0–12)
NEUTROPHILS # BLD AUTO: 3.61 10*3/MM3 (ref 1.4–6.5)
NEUTROPHILS NFR BLD AUTO: 50.6 % (ref 40–75)
OSMOLALITY SERPL CALC.SUM OF ELEC: 284.5 MOSM/KG (ref 273–305)
PLATELET # BLD AUTO: 106 10*3/MM3 (ref 130–400)
PMV BLD AUTO: 10.8 FL (ref 6–10)
POTASSIUM BLD-SCNC: 3.7 MMOL/L (ref 3.5–5.3)
PROT SERPL-MCNC: 5.8 G/DL (ref 6–8)
RBC # BLD AUTO: 4.74 10*6/MM3 (ref 4.7–6.1)
SODIUM BLD-SCNC: 142 MMOL/L (ref 135–153)
WBC NRBC COR # BLD: 7.14 10*3/MM3 (ref 4.5–12.5)

## 2017-01-04 PROCEDURE — 86140 C-REACTIVE PROTEIN: CPT | Performed by: INTERNAL MEDICINE

## 2017-01-04 PROCEDURE — 85025 COMPLETE CBC W/AUTO DIFF WBC: CPT | Performed by: INTERNAL MEDICINE

## 2017-01-04 PROCEDURE — 25010000002 HYDROCORTISONE SODIUM SUCCINATE 100 MG RECONSTITUTED SOLUTION: Performed by: INTERNAL MEDICINE

## 2017-01-04 PROCEDURE — 99239 HOSP IP/OBS DSCHRG MGMT >30: CPT | Performed by: INTERNAL MEDICINE

## 2017-01-04 PROCEDURE — 80053 COMPREHEN METABOLIC PANEL: CPT | Performed by: INTERNAL MEDICINE

## 2017-01-04 PROCEDURE — 63710000001 PREDNISONE PER 5 MG: Performed by: INTERNAL MEDICINE

## 2017-01-04 PROCEDURE — 82962 GLUCOSE BLOOD TEST: CPT

## 2017-01-04 PROCEDURE — 83735 ASSAY OF MAGNESIUM: CPT | Performed by: INTERNAL MEDICINE

## 2017-01-04 PROCEDURE — 25010000002 HYDROMORPHONE PER 4 MG: Performed by: INTERNAL MEDICINE

## 2017-01-04 RX ORDER — FUROSEMIDE 80 MG
40 TABLET ORAL DAILY PRN
Start: 2017-01-04

## 2017-01-04 RX ORDER — LORAZEPAM 0.5 MG/1
0.5 TABLET ORAL EVERY 8 HOURS PRN
Status: DISCONTINUED | OUTPATIENT
Start: 2017-01-04 | End: 2017-01-04 | Stop reason: HOSPADM

## 2017-01-04 RX ORDER — ASPIRIN 81 MG/1
81 TABLET, CHEWABLE ORAL DAILY
Status: DISCONTINUED | OUTPATIENT
Start: 2017-01-04 | End: 2017-01-04 | Stop reason: HOSPADM

## 2017-01-04 RX ORDER — PREGABALIN 100 MG/1
100 CAPSULE ORAL 3 TIMES DAILY
Status: DISCONTINUED | OUTPATIENT
Start: 2017-01-04 | End: 2017-01-04 | Stop reason: HOSPADM

## 2017-01-04 RX ORDER — CITALOPRAM 20 MG/1
40 TABLET ORAL DAILY
Status: DISCONTINUED | OUTPATIENT
Start: 2017-01-04 | End: 2017-01-04 | Stop reason: HOSPADM

## 2017-01-04 RX ORDER — PREDNISONE 10 MG/1
10 TABLET ORAL EVERY MORNING
Status: DISCONTINUED | OUTPATIENT
Start: 2017-01-04 | End: 2017-01-04 | Stop reason: HOSPADM

## 2017-01-04 RX ORDER — LISINOPRIL 2.5 MG/1
2.5 TABLET ORAL DAILY
Status: DISCONTINUED | OUTPATIENT
Start: 2017-01-04 | End: 2017-01-04 | Stop reason: HOSPADM

## 2017-01-04 RX ORDER — OXYCODONE HCL 10 MG/1
10 TABLET, FILM COATED, EXTENDED RELEASE ORAL 3 TIMES DAILY PRN
Status: DISCONTINUED | OUTPATIENT
Start: 2017-01-04 | End: 2017-01-04 | Stop reason: HOSPADM

## 2017-01-04 RX ORDER — NORTRIPTYLINE HYDROCHLORIDE 25 MG/1
50 CAPSULE ORAL NIGHTLY
Status: DISCONTINUED | OUTPATIENT
Start: 2017-01-04 | End: 2017-01-04 | Stop reason: HOSPADM

## 2017-01-04 RX ADMIN — HYDROMORPHONE HYDROCHLORIDE 0.5 MG: 1 INJECTION, SOLUTION INTRAMUSCULAR; INTRAVENOUS; SUBCUTANEOUS at 00:03

## 2017-01-04 RX ADMIN — PREGABALIN 100 MG: 100 CAPSULE ORAL at 13:31

## 2017-01-04 RX ADMIN — ZOLPIDEM TARTRATE 5 MG: 5 TABLET, FILM COATED ORAL at 00:03

## 2017-01-04 RX ADMIN — HYDROMORPHONE HYDROCHLORIDE 0.5 MG: 1 INJECTION, SOLUTION INTRAMUSCULAR; INTRAVENOUS; SUBCUTANEOUS at 06:41

## 2017-01-04 RX ADMIN — PANTOPRAZOLE SODIUM 40 MG: 40 INJECTION, POWDER, FOR SOLUTION INTRAVENOUS at 06:34

## 2017-01-04 RX ADMIN — CITALOPRAM HYDROBROMIDE 40 MG: 20 TABLET ORAL at 13:15

## 2017-01-04 RX ADMIN — ASPIRIN 81 MG: 81 TABLET, CHEWABLE ORAL at 13:16

## 2017-01-04 RX ADMIN — LISINOPRIL 2.5 MG: 2.5 TABLET ORAL at 13:15

## 2017-01-04 RX ADMIN — HYDROCORTISONE SODIUM SUCCINATE 25 MG: 100 INJECTION, POWDER, FOR SOLUTION INTRAMUSCULAR; INTRAVENOUS at 00:03

## 2017-01-04 RX ADMIN — PREDNISONE 10 MG: 10 TABLET ORAL at 13:16

## 2017-01-04 NOTE — PLAN OF CARE
Problem: Patient Care Overview (Adult)  Goal: Plan of Care Review  Outcome: Ongoing (interventions implemented as appropriate)    01/04/17 0307   Coping/Psychosocial Response Interventions   Plan Of Care Reviewed With patient   Patient Care Overview   Progress improving   Outcome Evaluation   Outcome Summary/Follow up Plan pt reports small, loose, brown bowel movement         Problem: Bowel Obstruction (Adult)  Goal: Signs and Symptoms of Listed Potential Problems Will be Absent or Manageable (Bowel Obstruction)  Outcome: Ongoing (interventions implemented as appropriate)    Problem: Fall Risk (Adult)  Goal: Identify Related Risk Factors and Signs and Symptoms  Outcome: Ongoing (interventions implemented as appropriate)  Goal: Absence of Falls  Outcome: Ongoing (interventions implemented as appropriate)    Problem: Diabetes, Type 2 (Adult)  Goal: Signs and Symptoms of Listed Potential Problems Will be Absent or Manageable (Diabetes, Type 2)  Outcome: Ongoing (interventions implemented as appropriate)

## 2017-01-04 NOTE — PLAN OF CARE
Problem: Patient Care Overview (Adult)  Goal: Plan of Care Review  Outcome: Ongoing (interventions implemented as appropriate)  Goal: Adult Individualization and Mutuality  Outcome: Ongoing (interventions implemented as appropriate)    Problem: Bowel Obstruction (Adult)  Goal: Signs and Symptoms of Listed Potential Problems Will be Absent or Manageable (Bowel Obstruction)  Outcome: Ongoing (interventions implemented as appropriate)    Problem: Fall Risk (Adult)  Goal: Identify Related Risk Factors and Signs and Symptoms  Outcome: Ongoing (interventions implemented as appropriate)  Goal: Absence of Falls  Outcome: Ongoing (interventions implemented as appropriate)    Problem: Diabetes, Type 2 (Adult)  Goal: Signs and Symptoms of Listed Potential Problems Will be Absent or Manageable (Diabetes, Type 2)  Outcome: Ongoing (interventions implemented as appropriate)

## 2017-01-04 NOTE — DISCHARGE SUMMARY
[facility] HOSPITALIST MEDICINE DISCHARGE SUMMARY    Patient Identification:  Name:  Moe Bridges  Age:  71 y.o.  Sex:  male  :  1945  MRN:  [id number]  Visit Number:  [id number]    Date of Admission: 2017  Date of Discharge:  2017     PCP: Chris Haji MD    DISCHARGE DIAGNOSIS  -Acute viral enteritis that may have caused a partial small bowel obstruction and left lower quadrant abdominal pain   -Mild thrombocytopenia that may be the result of Lyrica use  -Minimally elevated AST and ALT that may either be medication induced versus segment to fatty liver disease versus another cause  -COPD with no acute exacerbation  -Type 2 diabetes mellitus, non-insulin-dependent  -Disequilibrium for the last 30 years with oral prednisone for that time  -Essential hypertension  -A. fib status post ablation in  now with sick sinus syndrome and permanent pacemaker placement  -Morbid obesity  -QTc slightly prolonged (477 ms)  -Generalized anxiety disorder  -Peripheral neuropathy with chronic pain  -Ventral hernia above his umbilicus that is reducible and that was present prior to admission    CONSULTS   General surgery but they were unable to see the patient    PROCEDURES PERFORMED  None    HOSPITAL COURSE  Patient is a 71 y.o. male presented to [facility] complaining of nausea, vomiting, and left lower quadrant abdominal pain for 2 days.  CT scan in the emergency department was suggestive of a small bowel obstruction.  Please see the admitting history and physical for further details.  The patient was placed on the medical surgical floor.  He was made nothing by mouth and given IV fluids.  General surgery was consulted but they were not available during the time the patient was admitted.  The patient did not have any further episodes of vomiting while hospitalized.  He had flatus the entire time and then did produce a nonbloody bowel movement on the day of discharge.  On the  day of discharge, the patient was able to walk around the hospital and do all of his activities of daily living.  He was eating a regular diet and tolerating solid food with no nausea and no vomiting.  He did not have chest pain and he did not have shortness of air.  His abdominal pain had resolved.  The patient did request to go home.  We think that the patient was exposed to a virus that causes enteritis and a partial small bowel obstruction.  He was told to return to the hospital or go to his primary care provider if his symptoms returned.    VITAL SIGNS:  Last 3 weights    01/02/17  1232 01/02/17  2030   Weight: 250 lb (113 kg) 247 lb 4 oz (112 kg)     Body mass index is 35.48 kg/(m^2).    PHYSICAL EXAM:  Constitutional: Well-developed and well-nourished. No respiratory distress.    HENT:  Head: Normocephalic and atraumatic.  Mouth: Moist mucous membranes.    Eyes: Conjunctivae and EOM are normal. Pupils are equal, round, and reactive to light. No scleral icterus.    Neck: Neck supple. No JVD present.    Cardiovascular: Normal rate, regular rhythm and normal heart sounds with no murmur.  Pulmonary/Chest: No respiratory distress, no wheezes, no crackles, with normal breath sounds and good air movement.  Abdominal: Soft despite being slightly distended. Bowel sounds are hypoactive.  No tenderness to palpation. He has a ventral hernia above his umbilicus that is easily reduced and nontender with no skin changes.    Musculoskeletal: No edema, no tenderness, and no deformity. No red or swollen joints anywhere.   Neurological: Alert and oriented to person, place, and time. No cranial nerve deficit. No tongue deviation. No facial droop. No slurred speech.   Skin: Skin is warm and dry. No rash noted. No pallor.    Peripheral vascular: Strong pulses in all 4 extremities with no clubbing, no cyanosis, no edema.  Genitourinary: No Parada catheter in place.    DISCHARGE DISPOSITION   Stable    DISCHARGE MEDICATIONS:  aspirin  81 MG chewable tablet  Chew 81 mg Daily.   chlorzoxazone (PARAFON FORTE) 500 MG tablet  Take 500 mg by mouth 4 (Four) Times a Day As Needed for muscle spasms.   citalopram (CeleXA) 40 MG tablet  Take 40 mg by mouth Daily.   furosemide (LASIX) 80 MG tablet  Take 0.5 tablets by mouth Daily As Needed (for swelling).   glyBURIDE (DIAbeta) 5 MG tablet  Take 10 mg by mouth 2 (Two) Times a Day.   lisinopril (PRINIVIL,ZESTRIL) 2.5 MG tablet  Take 2.5 mg by mouth Daily.   LORazepam (ATIVAN) 0.5 MG tablet  Take 0.5 mg by mouth Every 8 (Eight) Hours As Needed for anxiety.   nortriptyline (PAMELOR) 50 MG capsule  Take 50 mg by mouth Every Night.   Omega-3 Fatty Acids (FISH OIL) 1000 MG capsule capsule  Take 2,000 mg by mouth 2 (Two) Times a Day With Meals.   oxyCODONE (oxyCONTIN) 10 MG 12 hr tablet  Take 10 mg by mouth 3 (Three) Times a Day As Needed for severe pain (7-10).   pioglitazone (ACTOS) 30 MG tablet  Take 30 mg by mouth Daily.   potassium chloride (K-DUR) 10 MEQ CR tablet  Take 10 mEq by mouth Every Morning.   predniSONE (DELTASONE) 10 MG tablet  Take 10 mg by mouth Every Morning.   pregabalin (LYRICA) 100 MG capsule  Take 100 mg by mouth 3 (Three) Times a Day.       Diet Instructions     Diet: Regular, Consistent Carbohydrate, Cardiac; Thin Liquids, No Restrictions           Scheduled Appointments     Apr 03, 2017  2:20 PM EDT   Follow Up with Diallo Francis MD   [facility] CARDIOLOGY (--)    [address] [place] [address] [phone]       Arrive 15 minutes prior to appointment.           Activity Instructions     Activity as Tolerated           Referrals and Follow-ups to Schedule     Follow-Up    As directed    Follow Up Details:  with PCP in one week             Follow-up Information     Follow up with Chris Haji MD .    Specialty:  General Practice    Contact information:    [address]  [place] [address] [phone]       TEST  RESULTS PENDING AT DISCHARGE       Alina  KAREN Hart MD  01/04/17  1:14 PM    Please note that this discharge summary required more than 30 minutes to complete.    Please send a copy of this dictation to the following providers:  Chris Haji MD

## 2017-01-19 ENCOUNTER — TELEPHONE (OUTPATIENT)
Dept: CARDIOLOGY | Facility: CLINIC | Age: 72
End: 2017-01-19

## 2017-01-19 NOTE — TELEPHONE ENCOUNTER
Called pt to see if he has had her labs done. No answer left message.   Called PCP and they were already closed.

## 2017-01-26 ENCOUNTER — TELEPHONE (OUTPATIENT)
Dept: CARDIOLOGY | Facility: CLINIC | Age: 72
End: 2017-01-26

## 2017-01-26 NOTE — TELEPHONE ENCOUNTER
Called pt to see if he had his labs done that was ordered back in June.   Spoke with pt's wife and she stated she would take him and get his labs done sometime this week or next at the [facility].

## 2017-06-09 ENCOUNTER — LAB (OUTPATIENT)
Dept: LAB | Facility: HOSPITAL | Age: 72
End: 2017-06-09

## 2017-06-09 ENCOUNTER — TRANSCRIBE ORDERS (OUTPATIENT)
Dept: LAB | Facility: HOSPITAL | Age: 72
End: 2017-06-09

## 2017-06-09 DIAGNOSIS — Z79.60 LONG-TERM USE OF IMMUNOSUPPRESSANT MEDICATION: ICD-10-CM

## 2017-06-09 DIAGNOSIS — E13.9 DIABETES 1.5, MANAGED AS TYPE 1 (HCC): ICD-10-CM

## 2017-06-09 DIAGNOSIS — E13.9 DIABETES 1.5, MANAGED AS TYPE 1 (HCC): Primary | ICD-10-CM

## 2017-06-09 LAB
ALBUMIN SERPL-MCNC: 4.1 G/DL (ref 3.4–4.8)
ALP SERPL-CCNC: 52 U/L (ref 40–129)
ALT SERPL W P-5'-P-CCNC: 57 U/L (ref 10–44)
ANION GAP SERPL CALCULATED.3IONS-SCNC: 6.7 MMOL/L (ref 3.6–11.2)
AST SERPL-CCNC: 44 U/L (ref 10–34)
BILIRUB CONJ SERPL-MCNC: 0.2 MG/DL (ref 0–0.2)
BILIRUB INDIRECT SERPL-MCNC: 0.4 MG/DL
BILIRUB SERPL-MCNC: 0.6 MG/DL (ref 0.2–1.8)
BUN BLD-MCNC: 20 MG/DL (ref 7–21)
BUN/CREAT SERPL: 16.9 (ref 7–25)
CALCIUM SPEC-SCNC: 9.8 MG/DL (ref 7.7–10)
CHLORIDE SERPL-SCNC: 106 MMOL/L (ref 99–112)
CHOLEST SERPL-MCNC: 198 MG/DL (ref 0–200)
CO2 SERPL-SCNC: 30.3 MMOL/L (ref 24.3–31.9)
CREAT BLD-MCNC: 1.18 MG/DL (ref 0.43–1.29)
GFR SERPL CREATININE-BSD FRML MDRD: 61 ML/MIN/1.73
GLUCOSE BLD-MCNC: 122 MG/DL (ref 70–110)
HBA1C MFR BLD: 6.9 % (ref 4.5–5.7)
HDLC SERPL-MCNC: 38 MG/DL (ref 60–100)
LDLC SERPL CALC-MCNC: 139 MG/DL (ref 0–100)
LDLC/HDLC SERPL: 3.65 {RATIO}
OSMOLALITY SERPL CALC.SUM OF ELEC: 288.9 MOSM/KG (ref 273–305)
POTASSIUM BLD-SCNC: 3.8 MMOL/L (ref 3.5–5.3)
PROT SERPL-MCNC: 7 G/DL (ref 6–8)
SODIUM BLD-SCNC: 143 MMOL/L (ref 135–153)
TRIGL SERPL-MCNC: 106 MG/DL (ref 0–150)
VLDLC SERPL-MCNC: 21.2 MG/DL

## 2017-06-09 PROCEDURE — 36415 COLL VENOUS BLD VENIPUNCTURE: CPT

## 2017-06-09 PROCEDURE — 83036 HEMOGLOBIN GLYCOSYLATED A1C: CPT | Performed by: GENERAL PRACTICE

## 2017-06-09 PROCEDURE — 80048 BASIC METABOLIC PNL TOTAL CA: CPT | Performed by: GENERAL PRACTICE

## 2017-06-09 PROCEDURE — 80061 LIPID PANEL: CPT | Performed by: GENERAL PRACTICE

## 2017-06-09 PROCEDURE — 80076 HEPATIC FUNCTION PANEL: CPT | Performed by: GENERAL PRACTICE

## 2017-07-06 ENCOUNTER — OFFICE VISIT (OUTPATIENT)
Dept: CARDIOLOGY | Facility: CLINIC | Age: 72
End: 2017-07-06

## 2017-07-06 VITALS
BODY MASS INDEX: 37.08 KG/M2 | OXYGEN SATURATION: 93 % | HEART RATE: 62 BPM | HEIGHT: 70 IN | SYSTOLIC BLOOD PRESSURE: 111 MMHG | RESPIRATION RATE: 16 BRPM | WEIGHT: 259 LBS | DIASTOLIC BLOOD PRESSURE: 66 MMHG

## 2017-07-06 DIAGNOSIS — I49.5 SSS (SICK SINUS SYNDROME) (HCC): Primary | Chronic | ICD-10-CM

## 2017-07-06 DIAGNOSIS — E78.5 DYSLIPIDEMIA ASSOCIATED WITH TYPE 2 DIABETES MELLITUS (HCC): Chronic | ICD-10-CM

## 2017-07-06 DIAGNOSIS — I10 ESSENTIAL HYPERTENSION: Chronic | ICD-10-CM

## 2017-07-06 DIAGNOSIS — E11.69 DYSLIPIDEMIA ASSOCIATED WITH TYPE 2 DIABETES MELLITUS (HCC): Chronic | ICD-10-CM

## 2017-07-06 DIAGNOSIS — I48.0 PAROXYSMAL ATRIAL FIBRILLATION (HCC): Chronic | ICD-10-CM

## 2017-07-06 DIAGNOSIS — R06.09 DYSPNEA ON EXERTION: ICD-10-CM

## 2017-07-06 PROCEDURE — 99213 OFFICE O/P EST LOW 20 MIN: CPT | Performed by: INTERNAL MEDICINE

## 2017-07-06 PROCEDURE — 93000 ELECTROCARDIOGRAM COMPLETE: CPT | Performed by: INTERNAL MEDICINE

## 2017-07-06 RX ORDER — MAGNESIUM CITRATE
POWDER (GRAM) MISCELLANEOUS
COMMUNITY

## 2017-07-06 RX ORDER — DOCUSATE SODIUM 100 MG/1
100 CAPSULE, LIQUID FILLED ORAL 2 TIMES DAILY
COMMUNITY

## 2017-07-06 RX ORDER — ZOLPIDEM TARTRATE 10 MG/1
10 TABLET ORAL NIGHTLY PRN
COMMUNITY

## 2017-07-06 RX ORDER — MULTIVIT WITH MINERALS/LUTEIN
1000 TABLET ORAL DAILY
COMMUNITY

## 2017-07-06 RX ORDER — OMEGA-3-ACID ETHYL ESTERS 1 G/1
2 CAPSULE, LIQUID FILLED ORAL 2 TIMES DAILY
COMMUNITY

## 2017-07-06 NOTE — PROGRESS NOTES
Chris Haji MD  Moe Bridges  1945 07/06/2017    Patient Active Problem List   Diagnosis   • SSS (sick sinus syndrome), status post permanent dual-chamber pacemaker implantation in 2010 with a [employer] device.   • Paroxysmal atrial fibrillation, status post RF ablation in 2007.   • Type 2 diabetes mellitus   • Essential hypertension   • Dyslipidemia associated with type 2 diabetes mellitus   • Small bowel obstruction   • Dyspnea on exertion (NYHA class III)       Dear Chris Haji MD:    Subjective     Moe Bridges is a 72 y.o. male with the problems as listed above, presents      History of Present Illness:Mr. Bridges is a pleasant 72-year-old  male with history of paroxysmal A. fib ablation, status post RF ablation in 2007, sick sinus syndrome, status post permanent pacemaker implantation 2010, COPD, hypertension and type 2 diabetes mellitus, presents for regular cardiology follow-up.  Complains of increasing dyspnea over the last few months.  He wears oxygen at night which seems to be helping his breathing.  His gait about 12 pounds since January 2017.  Denies any significant leg edema.  He denies any chest pains.  He's been a nonsmoker for 50 years.        Allergies   Allergen Reactions   • Handclens 2 In 1 [Benzalkonium Chloride]    :      Current Outpatient Prescriptions:   •  ascorbic acid (VITAMIN C) 1000 MG tablet, Take 1,000 mg by mouth Daily., Disp: , Rfl:   •  aspirin 81 MG chewable tablet, Chew 81 mg Daily., Disp: , Rfl:   •  chlorzoxazone (PARAFON FORTE) 500 MG tablet, Take 500 mg by mouth 4 (Four) Times a Day As Needed for muscle spasms., Disp: , Rfl:   •  citalopram (CeleXA) 40 MG tablet, Take 40 mg by mouth Daily., Disp: , Rfl:   •  docusate sodium (COLACE) 100 MG capsule, Take 100 mg by mouth 2 (Two) Times a Day., Disp: , Rfl:   •  furosemide (LASIX) 80 MG tablet, Take 0.5 tablets by mouth Daily As Needed (for swelling)., Disp: , Rfl:   •  glyBURIDE (DIAbeta)  "5 MG tablet, Take 10 mg by mouth 2 (Two) Times a Day., Disp: , Rfl:   •  lisinopril (PRINIVIL,ZESTRIL) 2.5 MG tablet, Take 2.5 mg by mouth Daily., Disp: , Rfl:   •  LORazepam (ATIVAN) 0.5 MG tablet, Take 0.5 mg by mouth Every 8 (Eight) Hours As Needed for anxiety., Disp: , Rfl:   •  Magnesium Citrate powder, , Disp: , Rfl:   •  nortriptyline (PAMELOR) 50 MG capsule, Take 50 mg by mouth Every Night., Disp: , Rfl:   •  omega-3 acid ethyl esters (LOVAZA) 1 G capsule, Take 2 g by mouth 2 (Two) Times a Day., Disp: , Rfl:   •  Omega-3 Fatty Acids (FISH OIL) 1000 MG capsule capsule, Take 2,000 mg by mouth 2 (Two) Times a Day With Meals., Disp: , Rfl:   •  oxyCODONE (oxyCONTIN) 10 MG 12 hr tablet, Take 10 mg by mouth 3 (Three) Times a Day As Needed for severe pain (7-10)., Disp: , Rfl:   •  pioglitazone (ACTOS) 30 MG tablet, Take 30 mg by mouth Daily., Disp: , Rfl:   •  potassium chloride (K-DUR) 10 MEQ CR tablet, Take 10 mEq by mouth Every Morning., Disp: , Rfl:   •  predniSONE (DELTASONE) 10 MG tablet, Take 10 mg by mouth Every Morning., Disp: , Rfl:   •  pregabalin (LYRICA) 100 MG capsule, Take 100 mg by mouth 3 (Three) Times a Day., Disp: , Rfl:   •  zolpidem (AMBIEN) 10 MG tablet, Take 10 mg by mouth At Night As Needed for Sleep., Disp: , Rfl:       The following portions of the patient's history were reviewed and updated as appropriate: allergies, current medications, past family history, past medical history, past social history, past surgical history and problem list.    Social History   Substance Use Topics   • Smoking status: Never Smoker   • Smokeless tobacco: Not on file   • Alcohol use No       Review of Systems   Constitution: Positive for malaise/fatigue.   Eyes:        Wear glasses     Cardiovascular: Positive for dyspnea on exertion and orthopnea. Negative for chest pain, irregular heartbeat, leg swelling, near-syncope and palpitations.   Respiratory: Positive for shortness of breath (\"smothering\").         " "Pt just placed on O2 @ HS x 2 wks ago.    Has helped with the smothering.     Musculoskeletal: Positive for falls (legs give out and falls averaging 2-3/wk. ).   Neurological: Positive for dizziness and light-headedness.       Objective   Vitals:    07/06/17 1423   BP: 111/66   BP Location: Right arm   Patient Position: Sitting   Cuff Size: Adult   Pulse: 62   Resp: 16   SpO2: 93%   Weight: 259 lb (117 kg)   Height: 70\" (177.8 cm)     Body mass index is 37.16 kg/(m^2).        Physical Exam   Constitutional: He is oriented to person, place, and time. He appears well-developed and well-nourished.   HENT:   Head: Normocephalic.   Eyes: Conjunctivae and EOM are normal.   Neck: Normal range of motion. Neck supple. No JVD present. No tracheal deviation present. No thyromegaly present.   Cardiovascular: Normal rate and regular rhythm.  Exam reveals no gallop and no friction rub.    No murmur heard.  Pulmonary/Chest: Breath sounds normal. No respiratory distress. He has no wheezes. He has no rales.   Abdominal: Soft. Bowel sounds are normal. He exhibits no mass. There is no tenderness.   Musculoskeletal: He exhibits edema (mild edema on both legs.).   Neurological: He is alert and oriented to person, place, and time. No cranial nerve deficit.   Skin: Skin is warm and dry.   Psychiatric: He has a normal mood and affect.       Lab Results   Component Value Date     06/09/2017    K 3.8 06/09/2017     06/09/2017    CO2 30.3 06/09/2017    BUN 20 06/09/2017    CREATININE 1.18 06/09/2017    GLUCOSE 122 (H) 06/09/2017    CALCIUM 9.8 06/09/2017    AST 44 (H) 06/09/2017    ALT 57 (H) 06/09/2017    ALKPHOS 52 06/09/2017    LABIL2 1.4 (L) 01/04/2017     Lab Results   Component Value Date    CKTOTAL 285 (H) 04/14/2014     Lab Results   Component Value Date    WBC 7.14 01/04/2017    HGB 14.2 01/04/2017    HCT 44.2 01/04/2017     (L) 01/04/2017     No results found for: INR  Lab Results   Component Value Date    MG 2.0 " 01/04/2017     Lab Results   Component Value Date    PSA 0.09 11/20/2014    CHLPL 196 11/20/2014    TRIG 106 06/09/2017    HDL 38 (L) 06/09/2017     (H) 11/20/2014      Lab Results   Component Value Date     (H) 03/10/2015     Echo   No results found for: ECHOEFEST    ECG 12 Lead  Date/Time: 7/6/2017 2:35 PM  Performed by: DIALLO FORTE  Authorized by: DIALLO FORTE   Comments:  100 percent ventricular pacing.            Assessment/Plan      1. SSS (sick sinus syndrome), status post permanent dual-chamber pacemaker implantation in 2010 with a [employer] device.     2. Paroxysmal atrial fibrillation, status post RF ablation in 2007.     3. Essential hypertension     4. Dyslipidemia associated with type 2 diabetes mellitus With intolerance to statins due to muscle aches.       Dyspnea (NYHA class III)            Recommendations:    1. We'll check BNP and BMP.  2. Continue with current medications for now.  3. Follow up in 3-4 months.       No Follow-up on file.    As always, I appreciate very much the opportunity to participate in the cardiovascular care of your patients.      With Best Regards,    Diallo Forte MD, FACC    Dragon disclaimer:  Much of this encounter note is an electronic transcription/translation of spoken language to printed text. The electronic translation of spoken language may permit erroneous, or at times, nonsensical words or phrases to be inadvertently transcribed; Although I have reviewed the note for such errors, some may still exist.

## 2017-08-24 ENCOUNTER — OFFICE VISIT (OUTPATIENT)
Dept: PULMONOLOGY | Facility: CLINIC | Age: 72
End: 2017-08-24

## 2017-08-24 VITALS
HEART RATE: 80 BPM | TEMPERATURE: 98.2 F | WEIGHT: 250 LBS | OXYGEN SATURATION: 92 % | HEIGHT: 70 IN | DIASTOLIC BLOOD PRESSURE: 72 MMHG | BODY MASS INDEX: 35.79 KG/M2 | SYSTOLIC BLOOD PRESSURE: 120 MMHG

## 2017-08-24 DIAGNOSIS — E66.9 OBESITY (BMI 30-39.9): ICD-10-CM

## 2017-08-24 DIAGNOSIS — J60 COAL WORKERS PNEUMOCONIOSIS (HCC): ICD-10-CM

## 2017-08-24 DIAGNOSIS — J41.0 SIMPLE CHRONIC BRONCHITIS (HCC): Primary | ICD-10-CM

## 2017-08-24 DIAGNOSIS — G47.33 OSA (OBSTRUCTIVE SLEEP APNEA): ICD-10-CM

## 2017-08-24 PROCEDURE — 99204 OFFICE O/P NEW MOD 45 MIN: CPT | Performed by: INTERNAL MEDICINE

## 2017-08-24 NOTE — PROGRESS NOTES
Subjective   Moe Bridges is a 72 y.o. male who is being seen for Pneumoconiosis    History of Present Illness   This 72-year-old gentleman has been referred to us by his primary care provider for evaluation of ongoing shortness of breath.  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  He was at his usual state of health until about 2-3 years ago when he started noticing progressive increase in his shortness of breath.  Now he has difficulty walking up to his mailbox.  Walking uphill is a real difficulty.  He also has some cough which is mostly nonproductive.      Patient never smoked but tells me that he was exposed to heavy passive smoking.  No was exposed to coal dust and dropped dust at his work place.  Past Medical History:   Diagnosis Date   • A-fib     s/p ablation   • Arthritis    • COPD (chronic obstructive pulmonary disease)    • Diabetes mellitus    • Dyspnea     class 3   • History of cardiac radiofrequency ablation    • Hypertension    • Neuropathic pain    • Sick sinus syndrome      Past Surgical History:   Procedure Laterality Date   • CARPAL TUNNEL RELEASE     • CHOLECYSTECTOMY     • PACEMAKER IMPLANTATION     • ROTATOR CUFF REPAIR       Family History   Problem Relation Age of Onset   • Heart disease Father    • Heart attack Father    • Heart failure Father       reports that he has never smoked. He does not have any smokeless tobacco history on file. He reports that he does not drink alcohol or use illicit drugs.  Allergies   Allergen Reactions   • Handclens 2 In 1 [Benzalkonium Chloride]            The following portions of the patient's history were reviewed and updated as appropriate: allergies, current medications, past family history, past medical history, past social history, past surgical history and problem list.    Review of Systems   Constitutional: Negative for fever.   HENT: Positive for  "rhinorrhea. Negative for ear pain.    Respiratory: Positive for shortness of breath and wheezing.    Cardiovascular: Negative for chest pain and leg swelling.   Gastrointestinal: Negative for abdominal pain and vomiting.   Musculoskeletal: Negative for neck pain.   Skin: Negative for rash.   Neurological: Negative for headaches.       Objective   /72 (BP Location: Left arm, Patient Position: Sitting, Cuff Size: Adult)  Pulse 80  Temp 98.2 °F (36.8 °C) (Oral)   Ht 70\" (177.8 cm)  Wt 250 lb (113 kg)  SpO2 92%  BMI 35.87 kg/m2  Physical Exam   Constitutional: He is oriented to person, place, and time.   HENT:   Head: Normocephalic and atraumatic.   Nose: Mucosal edema present.   Eyes: EOM are normal. Pupils are equal, round, and reactive to light.   Neck: Neck supple.   Cardiovascular: Normal rate, regular rhythm and normal heart sounds.    Pulmonary/Chest: He has rhonchi.   Vesicular breath sound bilaterally with prolonged expiratory phase   Abdominal: Soft. Bowel sounds are normal.   Musculoskeletal: Normal range of motion. He exhibits no deformity.   Neurological: He is alert and oriented to person, place, and time.   Skin: Skin is warm and dry.   Psychiatric: He has a normal mood and affect. His behavior is normal.   Nursing note and vitals reviewed.        Radiology:  No Images in the past 120 days found..    Lab Results:  Lab on 06/09/2017   Component Date Value Ref Range Status   • Hemoglobin A1C 06/09/2017 6.90* 4.50 - 5.70 % Final   • Glucose 06/09/2017 122* 70 - 110 mg/dL Final   • BUN 06/09/2017 20  7 - 21 mg/dL Final   • Creatinine 06/09/2017 1.18  0.43 - 1.29 mg/dL Final   • Sodium 06/09/2017 143  135 - 153 mmol/L Final   • Potassium 06/09/2017 3.8  3.5 - 5.3 mmol/L Final   • Chloride 06/09/2017 106  99 - 112 mmol/L Final   • CO2 06/09/2017 30.3  24.3 - 31.9 mmol/L Final   • Calcium 06/09/2017 9.8  7.7 - 10.0 mg/dL Final   • eGFR Non African Amer 06/09/2017 61  >60 mL/min/1.73 Final   • " BUN/Creatinine Ratio 06/09/2017 16.9  7.0 - 25.0 Final   • Anion Gap 06/09/2017 6.7  3.6 - 11.2 mmol/L Final   • Total Protein 06/09/2017 7.0  6.0 - 8.0 g/dL Final   • Albumin 06/09/2017 4.10  3.40 - 4.80 g/dL Final   • ALT (SGPT) 06/09/2017 57* 10 - 44 U/L Final   • AST (SGOT) 06/09/2017 44* 10 - 34 U/L Final   • Alkaline Phosphatase 06/09/2017 52  40 - 129 U/L Final   • Total Bilirubin 06/09/2017 0.6  0.2 - 1.8 mg/dL Final   • Bilirubin, Direct 06/09/2017 0.2  0.0 - 0.2 mg/dL Final   • Bilirubin, Indirect 06/09/2017 0.4  mg/dL Final   • Total Cholesterol 06/09/2017 198  0 - 200 mg/dL Final   • Triglycerides 06/09/2017 106  0 - 150 mg/dL Final   • HDL Cholesterol 06/09/2017 38* 60 - 100 mg/dL Final   • LDL Cholesterol  06/09/2017 139* 0 - 100 mg/dL Final   • VLDL Cholesterol 06/09/2017 21.2  mg/dL Final   • LDL/HDL Ratio 06/09/2017 3.65   Final   • Osmolality Calc 06/09/2017 288.9  273.0 - 305.0 mOsm/kg Final       Assessment      ICD-10-CM ICD-9-CM   1. Simple chronic bronchitis J41.0 491.0   2. JUSTINO (obstructive sleep apnea) G47.33 327.23   3. Obesity (BMI 30-39.9) E66.9 278.00   4. Coal workers pneumoconiosis J60 500                DISCUSSION:  This patient's ongoing shortness of breath may be from his underlying coal workers pneumoconiosis.  But he also had exposure to passive smoking and a simple chronic bronchitis comes in the differential diagnosis.  He has a history of obstructive sleep apnea but is CPAP intolerant And noncompliant.  I had a good discussion with him about this but he is not interested to pursue this right at this point.  We would send him for a pulmonary function test and I would see him again after that and at that point would discuss about the issues again    Plan    Orders Placed This Encounter   Procedures   • Pulmonary Function Test     No orders of the defined types were placed in this encounter.                 Tiara Churchill MD, FCCP, FAASM  Pulmonary, Critical Care, and  Sleep Medicine

## 2017-09-14 ENCOUNTER — CLINICAL SUPPORT (OUTPATIENT)
Dept: CARDIOLOGY | Facility: CLINIC | Age: 72
End: 2017-09-14

## 2017-09-14 DIAGNOSIS — I49.5 SSS (SICK SINUS SYNDROME) (HCC): Primary | ICD-10-CM

## 2017-09-14 PROCEDURE — 93288 INTERROG EVL PM/LDLS PM IP: CPT | Performed by: INTERNAL MEDICINE

## 2017-09-25 ENCOUNTER — HOSPITAL ENCOUNTER (OUTPATIENT)
Dept: RESPIRATORY THERAPY | Facility: HOSPITAL | Age: 72
Discharge: HOME | End: 2017-09-25
Attending: INTERNAL MEDICINE | Admitting: INTERNAL MEDICINE

## 2017-09-25 DIAGNOSIS — E66.9 OBESITY (BMI 30-39.9): ICD-10-CM

## 2017-09-25 DIAGNOSIS — J41.0 SIMPLE CHRONIC BRONCHITIS (HCC): ICD-10-CM

## 2017-09-25 DIAGNOSIS — G47.33 OSA (OBSTRUCTIVE SLEEP APNEA): ICD-10-CM

## 2017-09-25 PROCEDURE — 94727 GAS DIL/WSHOT DETER LNG VOL: CPT

## 2017-09-25 PROCEDURE — 94729 DIFFUSING CAPACITY: CPT | Performed by: INTERNAL MEDICINE

## 2017-09-25 PROCEDURE — 94727 GAS DIL/WSHOT DETER LNG VOL: CPT | Performed by: INTERNAL MEDICINE

## 2017-09-25 PROCEDURE — 94060 EVALUATION OF WHEEZING: CPT

## 2017-09-25 PROCEDURE — 94729 DIFFUSING CAPACITY: CPT

## 2017-09-25 PROCEDURE — 94060 EVALUATION OF WHEEZING: CPT | Performed by: INTERNAL MEDICINE

## 2017-10-06 ENCOUNTER — TELEPHONE (OUTPATIENT)
Dept: CARDIOLOGY | Facility: CLINIC | Age: 72
End: 2017-10-06

## 2017-10-06 NOTE — TELEPHONE ENCOUNTER
Called pt to remind him of his labs. Spoke with his wife and she stated that that labs was being order to see what blood thinner we wanted to put him on and he doesn't want to take any blood thinners and that he doesn't want to have the labs done. I advised her that I will document this in his chart. She expressed understanding.

## 2017-10-25 ENCOUNTER — OFFICE VISIT (OUTPATIENT)
Dept: PULMONOLOGY | Facility: CLINIC | Age: 72
End: 2017-10-25

## 2017-10-25 VITALS
HEIGHT: 70 IN | OXYGEN SATURATION: 90 % | HEART RATE: 77 BPM | BODY MASS INDEX: 37.8 KG/M2 | SYSTOLIC BLOOD PRESSURE: 87 MMHG | TEMPERATURE: 98.4 F | DIASTOLIC BLOOD PRESSURE: 50 MMHG | WEIGHT: 264 LBS

## 2017-10-25 DIAGNOSIS — J41.0 SIMPLE CHRONIC BRONCHITIS (HCC): Primary | ICD-10-CM

## 2017-10-25 DIAGNOSIS — G47.33 OSA (OBSTRUCTIVE SLEEP APNEA): ICD-10-CM

## 2017-10-25 DIAGNOSIS — E66.9 OBESITY (BMI 30-39.9): ICD-10-CM

## 2017-10-25 DIAGNOSIS — R94.2 RESTRICTIVE VENTILATORY DEFECT: ICD-10-CM

## 2017-10-25 PROCEDURE — 99214 OFFICE O/P EST MOD 30 MIN: CPT | Performed by: INTERNAL MEDICINE

## 2017-10-25 NOTE — PROGRESS NOTES
Subjective   Moe Bridges is a 72 y.o. male who is being seen for Bronchitis    History of Present Illness   Patient returns after pulmonary function test.    Subjectively he still has shortness of breath on mild exertion.  Essentially no change in his status since last visit.  Past Medical History:   Diagnosis Date   • A-fib     s/p ablation   • Arthritis    • COPD (chronic obstructive pulmonary disease)    • Diabetes mellitus    • Dyspnea     class 3   • History of cardiac radiofrequency ablation    • Hypertension    • Neuropathic pain    • Sick sinus syndrome      Past Surgical History:   Procedure Laterality Date   • CARPAL TUNNEL RELEASE     • CHOLECYSTECTOMY     • PACEMAKER IMPLANTATION     • ROTATOR CUFF REPAIR       Family History   Problem Relation Age of Onset   • Heart disease Father    • Heart attack Father    • Heart failure Father       reports that he has never smoked. He does not have any smokeless tobacco history on file. He reports that he does not drink alcohol or use illicit drugs.  Allergies   Allergen Reactions   • Handclens 2 In 1 [Benzalkonium Chloride]          The following portions of the patient's history were reviewed and updated as appropriate: allergies, current medications, past family history, past medical history, past social history, past surgical history and problem list.    Review of Systems   Constitutional: Positive for fatigue. Negative for appetite change, chills, diaphoresis and unexpected weight change.   HENT: Negative for sore throat, trouble swallowing and voice change.    Eyes: Negative for visual disturbance.   Respiratory: Positive for cough and shortness of breath. Negative for apnea, choking and wheezing.    Cardiovascular: Negative for chest pain, palpitations and leg swelling.   Gastrointestinal: Negative for abdominal pain, constipation, diarrhea, nausea and vomiting.   Endocrine: Negative for cold intolerance, heat intolerance, polydipsia, polyphagia and  "polyuria.   Genitourinary: Negative for difficulty urinating and dysuria.   Musculoskeletal: Negative for gait problem.   Skin: Negative for rash and wound.   Neurological: Negative for syncope and light-headedness.   Hematological: Negative for adenopathy.   Psychiatric/Behavioral: Positive for sleep disturbance. Negative for agitation, behavioral problems and confusion.   All other systems reviewed and are negative.      Objective   BP (!) 87/50 (BP Location: Left arm, Patient Position: Sitting, Cuff Size: Adult)  Pulse 77  Temp 98.4 °F (36.9 °C) (Oral)   Ht 177.8 cm (70\")  Wt 120 kg (264 lb)  SpO2 90%  BMI 37.88 kg/m2  Physical Exam      Radiology:  No Images in the past 120 days found..          Lab Results:  Lab on 06/09/2017   Component Date Value Ref Range Status   • Hemoglobin A1C 06/09/2017 6.90* 4.50 - 5.70 % Final   • Glucose 06/09/2017 122* 70 - 110 mg/dL Final   • BUN 06/09/2017 20  7 - 21 mg/dL Final   • Creatinine 06/09/2017 1.18  0.43 - 1.29 mg/dL Final   • Sodium 06/09/2017 143  135 - 153 mmol/L Final   • Potassium 06/09/2017 3.8  3.5 - 5.3 mmol/L Final   • Chloride 06/09/2017 106  99 - 112 mmol/L Final   • CO2 06/09/2017 30.3  24.3 - 31.9 mmol/L Final   • Calcium 06/09/2017 9.8  7.7 - 10.0 mg/dL Final   • eGFR Non African Amer 06/09/2017 61  >60 mL/min/1.73 Final   • BUN/Creatinine Ratio 06/09/2017 16.9  7.0 - 25.0 Final   • Anion Gap 06/09/2017 6.7  3.6 - 11.2 mmol/L Final   • Total Protein 06/09/2017 7.0  6.0 - 8.0 g/dL Final   • Albumin 06/09/2017 4.10  3.40 - 4.80 g/dL Final   • ALT (SGPT) 06/09/2017 57* 10 - 44 U/L Final   • AST (SGOT) 06/09/2017 44* 10 - 34 U/L Final   • Alkaline Phosphatase 06/09/2017 52  40 - 129 U/L Final   • Total Bilirubin 06/09/2017 0.6  0.2 - 1.8 mg/dL Final   • Bilirubin, Direct 06/09/2017 0.2  0.0 - 0.2 mg/dL Final   • Bilirubin, Indirect 06/09/2017 0.4  mg/dL Final   • Total Cholesterol 06/09/2017 198  0 - 200 mg/dL Final   • Triglycerides 06/09/2017 106  0 - " 150 mg/dL Final   • HDL Cholesterol 06/09/2017 38* 60 - 100 mg/dL Final   • LDL Cholesterol  06/09/2017 139* 0 - 100 mg/dL Final   • VLDL Cholesterol 06/09/2017 21.2  mg/dL Final   • LDL/HDL Ratio 06/09/2017 3.65   Final   • Osmolality Calc 06/09/2017 288.9  273.0 - 305.0 mOsm/kg Final       Assessment      ICD-10-CM ICD-9-CM   1. Simple chronic bronchitis J41.0 491.0   2. Restrictive ventilatory defect R94.2 794.2   3. JUSTINO (obstructive sleep apnea) G47.33 327.23   4. Obesity (BMI 30-39.9) E66.9 278.00                DISCUSSION:  Reviewed the pulmonary function test.  FEV1 was minimally decreased to 72% predicted, FEV1 to FVC ratio on the other hand was increased to 85.  Findings are consistent with a mixture of obstructive airways disease with mild degree from COPD and a restrictive and are defect of mild degree from abdominal obesity.  I have asked him to continue his current inhaler, giving him a sample of Anoro,  and we are also sending him for a chest x-ray.    Plan    Orders Placed This Encounter   Procedures   • XR Chest PA & Lateral     New Medications Ordered This Visit   Medications   • umeclidinium-vilanterol (ANORO ELLIPTA) 62.5-25 MCG/INH aerosol powder  inhaler     Sig: Inhale 1 puff Daily.     Dispense:  1 each     Refill:  6                  Tiara Churchill MD, FCCP, FAASM  Pulmonary, Critical Care, and Sleep Medicine

## 2017-12-01 ENCOUNTER — HOSPITAL ENCOUNTER (OUTPATIENT)
Dept: GENERAL RADIOLOGY | Facility: HOSPITAL | Age: 72
Discharge: HOME | End: 2017-12-01
Attending: INTERNAL MEDICINE | Admitting: INTERNAL MEDICINE

## 2017-12-01 ENCOUNTER — LAB (OUTPATIENT)
Dept: LAB | Facility: HOSPITAL | Age: 72
End: 2017-12-01

## 2017-12-01 ENCOUNTER — TRANSCRIBE ORDERS (OUTPATIENT)
Dept: LAB | Facility: HOSPITAL | Age: 72
End: 2017-12-01

## 2017-12-01 DIAGNOSIS — Z79.891 LONG TERM (CURRENT) USE OF OPIATE ANALGESIC: ICD-10-CM

## 2017-12-01 DIAGNOSIS — J41.0 SIMPLE CHRONIC BRONCHITIS (HCC): ICD-10-CM

## 2017-12-01 DIAGNOSIS — I10 HYPERTENSION, UNSPECIFIED TYPE: ICD-10-CM

## 2017-12-01 DIAGNOSIS — E66.9 OBESITY (BMI 30-39.9): ICD-10-CM

## 2017-12-01 DIAGNOSIS — G47.33 OSA (OBSTRUCTIVE SLEEP APNEA): ICD-10-CM

## 2017-12-01 DIAGNOSIS — R94.2 RESTRICTIVE VENTILATORY DEFECT: ICD-10-CM

## 2017-12-01 DIAGNOSIS — E08.8 DIABETES DUE TO UNDERLYING CONDITION W UNSP COMPLICATIONS (HCC): ICD-10-CM

## 2017-12-01 DIAGNOSIS — I10 HYPERTENSION, UNSPECIFIED TYPE: Primary | ICD-10-CM

## 2017-12-01 LAB
ALBUMIN SERPL-MCNC: 4.1 G/DL (ref 3.4–4.8)
ALP SERPL-CCNC: 60 U/L (ref 40–129)
ALT SERPL W P-5'-P-CCNC: 62 U/L (ref 10–44)
ANION GAP SERPL CALCULATED.3IONS-SCNC: 5 MMOL/L (ref 3.6–11.2)
AST SERPL-CCNC: 43 U/L (ref 10–34)
BILIRUB CONJ SERPL-MCNC: 0.2 MG/DL (ref 0–0.2)
BILIRUB INDIRECT SERPL-MCNC: 0.3 MG/DL
BILIRUB SERPL-MCNC: 0.5 MG/DL (ref 0.2–1.8)
BUN BLD-MCNC: 22 MG/DL (ref 7–21)
BUN/CREAT SERPL: 17.9 (ref 7–25)
CALCIUM SPEC-SCNC: 9 MG/DL (ref 7.7–10)
CHLORIDE SERPL-SCNC: 108 MMOL/L (ref 99–112)
CHOLEST SERPL-MCNC: 226 MG/DL (ref 0–200)
CO2 SERPL-SCNC: 33 MMOL/L (ref 24.3–31.9)
CREAT BLD-MCNC: 1.23 MG/DL (ref 0.43–1.29)
GFR SERPL CREATININE-BSD FRML MDRD: 58 ML/MIN/1.73
GLUCOSE BLD-MCNC: 120 MG/DL (ref 70–110)
HBA1C MFR BLD: 7.6 % (ref 4.5–5.7)
HDLC SERPL-MCNC: 42 MG/DL (ref 60–100)
LDLC SERPL CALC-MCNC: 155 MG/DL (ref 0–100)
LDLC/HDLC SERPL: 3.7 {RATIO}
OSMOLALITY SERPL CALC.SUM OF ELEC: 295.1 MOSM/KG (ref 273–305)
POTASSIUM BLD-SCNC: 3.9 MMOL/L (ref 3.5–5.3)
PROT SERPL-MCNC: 6.6 G/DL (ref 6–8)
SODIUM BLD-SCNC: 146 MMOL/L (ref 135–153)
TRIGL SERPL-MCNC: 144 MG/DL (ref 0–150)
VLDLC SERPL-MCNC: 28.8 MG/DL

## 2017-12-01 PROCEDURE — 80076 HEPATIC FUNCTION PANEL: CPT

## 2017-12-01 PROCEDURE — 71020 HC CHEST PA AND LATERAL: CPT

## 2017-12-01 PROCEDURE — 80048 BASIC METABOLIC PNL TOTAL CA: CPT

## 2017-12-01 PROCEDURE — 36415 COLL VENOUS BLD VENIPUNCTURE: CPT

## 2017-12-01 PROCEDURE — 83036 HEMOGLOBIN GLYCOSYLATED A1C: CPT

## 2017-12-01 PROCEDURE — 71020 XR CHEST PA AND LATERAL: CPT | Performed by: RADIOLOGY

## 2017-12-01 PROCEDURE — 80061 LIPID PANEL: CPT

## 2018-03-20 ENCOUNTER — OFFICE VISIT (OUTPATIENT)
Dept: CARDIOLOGY | Facility: CLINIC | Age: 73
End: 2018-03-20

## 2018-03-20 VITALS
RESPIRATION RATE: 16 BRPM | BODY MASS INDEX: 38.65 KG/M2 | HEART RATE: 65 BPM | SYSTOLIC BLOOD PRESSURE: 141 MMHG | DIASTOLIC BLOOD PRESSURE: 80 MMHG | WEIGHT: 270 LBS | OXYGEN SATURATION: 91 % | HEIGHT: 70 IN

## 2018-03-20 DIAGNOSIS — E11.69 DYSLIPIDEMIA ASSOCIATED WITH TYPE 2 DIABETES MELLITUS (HCC): ICD-10-CM

## 2018-03-20 DIAGNOSIS — I48.0 PAROXYSMAL ATRIAL FIBRILLATION (HCC): ICD-10-CM

## 2018-03-20 DIAGNOSIS — I10 ESSENTIAL HYPERTENSION: ICD-10-CM

## 2018-03-20 DIAGNOSIS — I49.5 SSS (SICK SINUS SYNDROME) (HCC): Primary | ICD-10-CM

## 2018-03-20 DIAGNOSIS — E78.5 DYSLIPIDEMIA ASSOCIATED WITH TYPE 2 DIABETES MELLITUS (HCC): ICD-10-CM

## 2018-03-20 PROCEDURE — 99213 OFFICE O/P EST LOW 20 MIN: CPT | Performed by: PHYSICIAN ASSISTANT

## 2018-03-20 PROCEDURE — 93000 ELECTROCARDIOGRAM COMPLETE: CPT | Performed by: PHYSICIAN ASSISTANT

## 2018-03-20 NOTE — PATIENT INSTRUCTIONS
BMI for Adults  Body mass index (BMI) is a number that is calculated from a person's weight and height. In most adults, the number is used to find how much of an adult's weight is made up of fat. BMI is not as accurate as a direct measure of body fat.  HOW IS BMI CALCULATED?  BMI is calculated by dividing weight in kilograms by height in meters squared. It can also be calculated by dividing weight in pounds by height in inches squared, then multiplying the resulting number by 703. Charts are available to help you find your BMI quickly and easily without doing this calculation.   HOW IS BMI INTERPRETED?  Health care professionals use BMI charts to identify whether an adult is underweight, at a normal weight, or overweight based on the following guidelines:  · Underweight: BMI less than 18.5.  · Normal weight: BMI between 18.5 and 24.9.  · Overweight: BMI between 25 and 29.9.  · Obese: BMI of 30 and above.  BMI is usually interpreted the same for males and females.  Weight includes both fat and muscle, so someone with a muscular build, such as an athlete, may have a BMI that is higher than 24.9. In cases like these, BMI may not accurately depict body fat. To determine if excess body fat is the cause of a BMI of 25 or higher, further assessments may need to be done by a health care provider.  WHY IS BMI A USEFUL TOOL?  BMI is used to identify a possible weight problem that may be related to a medical problem or may increase the risk for medical problems. BMI can also be used to promote changes to reach a healthy weight.     This information is not intended to replace advice given to you by your health care provider. Make sure you discuss any questions you have with your health care provider.     Document Released: 08/29/2005 Document Revised: 01/08/2016 Document Reviewed: 05/15/2015  [employer] Interactive Patient Education ©2017 [employer]       Calorie Counting for Weight Loss  Calories are energy you get from the  things you eat and drink. Your body uses this energy to keep you going throughout the day. The number of calories you eat affects your weight. When you eat more calories than your body needs, your body stores the extra calories as fat. When you eat fewer calories than your body needs, your body burns fat to get the energy it needs.  Calorie counting means keeping track of how many calories you eat and drink each day. If you make sure to eat fewer calories than your body needs, you should lose weight. In order for calorie counting to work, you will need to eat the number of calories that are right for you in a day to lose a healthy amount of weight per week. A healthy amount of weight to lose per week is usually 1-2 lb (0.5-0.9 kg). A dietitian can determine how many calories you need in a day and give you suggestions on how to reach your calorie goal.   WHAT IS MY MY PLAN?  My goal is to have __________ calories per day.   If I have this many calories per day, I should lose around __________ pounds per week.  WHAT DO I NEED TO KNOW ABOUT CALORIE COUNTING?  In order to meet your daily calorie goal, you will need to:  · Find out how many calories are in each food you would like to eat. Try to do this before you eat.  · Decide how much of the food you can eat.  · Write down what you ate and how many calories it had. Doing this is called keeping a food log.  WHERE DO I FIND CALORIE INFORMATION?  The number of calories in a food can be found on a Nutrition Facts label. Note that all the information on a label is based on a specific serving of the food. If a food does not have a Nutrition Facts label, try to look up the calories online or ask your dietitian for help.  HOW DO I DECIDE HOW MUCH TO EAT?  To decide how much of the food you can eat, you will need to consider both the number of calories in one serving and the size of one serving. This information can be found on the Nutrition Facts label. If a food does not  have a Nutrition Facts label, look up the information online or ask your dietitian for help.  Remember that calories are listed per serving. If you choose to have more than one serving of a food, you will have to multiply the calories per serving by the amount of servings you plan to eat. For example, the label on a package of bread might say that a serving size is 1 slice and that there are 90 calories in a serving. If you eat 1 slice, you will have eaten 90 calories. If you eat 2 slices, you will have eaten 180 calories.  HOW DO I KEEP A FOOD LOG?  After each meal, record the following information in your food log:  · What you ate.  · How much of it you ate.  · How many calories it had.  · Then, add up your calories.  Keep your food log near you, such as in a small notebook in your pocket. Another option is to use a mobile delmy or website. Some programs will calculate calories for you and show you how many calories you have left each time you add an item to the log.  WHAT ARE SOME CALORIE COUNTING TIPS?  · Use your calories on foods and drinks that will fill you up and not leave you hungry. Some examples of this include foods like nuts and nut butters, vegetables, lean proteins, and high-fiber foods (more than 5 g fiber per serving).  · Eat nutritious foods and avoid empty calories. Empty calories are calories you get from foods or beverages that do not have many nutrients, such as candy and soda. It is better to have a nutritious high-calorie food (such as an avocado) than a food with few nutrients (such as a bag of chips).  · Know how many calories are in the foods you eat most often. This way, you do not have to look up how many calories they have each time you eat them.  · Look out for foods that may seem like low-calorie foods but are really high-calorie foods, such as baked goods, soda, and fat-free candy.  · Pay attention to calories in drinks. Drinks such as sodas, specialty coffee drinks, alcohol, and  juices have a lot of calories yet do not fill you up. Choose low-calorie drinks like water and diet drinks.  · Focus your calorie counting efforts on higher calorie items. Logging the calories in a garden salad that contains only vegetables is less important than calculating the calories in a milk shake.  · Find a way of tracking calories that works for you. Get creative. Most people who are successful find ways to keep track of how much they eat in a day, even if they do not count every calorie.  WHAT ARE SOME PORTION CONTROL TIPS?  · Know how many calories are in a serving. This will help you know how many servings of a certain food you can have.  · Use a measuring cup to measure serving sizes. This is helpful when you start out. With time, you will be able to estimate serving sizes for some foods.  · Take some time to put servings of different foods on your favorite plates, bowls, and cups so you know what a serving looks like.  · Try not to eat straight from a bag or box. Doing this can lead to overeating. Put the amount you would like to eat in a cup or on a plate to make sure you are eating the right portion.  · Use smaller plates, glasses, and bowls to prevent overeating. This is a quick and easy way to practice portion control. If your plate is smaller, less food can fit on it.  · Try not to multitask while eating, such as watching TV or using your computer. If it is time to eat, sit down at a table and enjoy your food. Doing this will help you to start recognizing when you are full. It will also make you more aware of what and how much you are eating.  HOW CAN I CALORIE COUNT WHEN EATING OUT?  · Ask for smaller portion sizes or child-sized portions.  · Consider sharing an entree and sides instead of getting your own entree.  · If you get your own entree, eat only half. Ask for a box at the beginning of your meal and put the rest of your entree in it so you are not tempted to eat it.  · Look for the calories  "on the menu. If calories are listed, choose the lower calorie options.  · Choose dishes that include vegetables, fruits, whole grains, low-fat dairy products, and lean protein. Focusing on smart food choices from each of the 5 food groups can help you stay on track at restaurants.  · Choose items that are boiled, broiled, grilled, or steamed.  · Choose water, milk, unsweetened iced tea, or other drinks without added sugars. If you want an alcoholic beverage, choose a lower calorie option. For example, a regular zac can have up to 700 calories and a glass of wine has around 150.  · Stay away from items that are buttered, battered, fried, or served with cream sauce. Items labeled \"crispy\" are usually fried, unless stated otherwise.  · Ask for dressings, sauces, and syrups on the side. These are usually very high in calories, so do not eat much of them.  · Watch out for salads. Many people think salads are a healthy option, but this is often not the case. Many salads come with burciaga, fried chicken, lots of cheese, fried chips, and dressing. All of these items have a lot of calories. If you want a salad, choose a garden salad and ask for grilled meats or steak. Ask for the dressing on the side, or ask for olive oil and vinegar or lemon to use as dressing.  · Estimate how many servings of a food you are given. For example, a serving of cooked rice is ½ cup or about the size of half a tennis ball or one cupcake wrapper. Knowing serving sizes will help you be aware of how much food you are eating at restaurants. The list below tells you how big or small some common portion sizes are based on everyday objects.  ¨ 1 oz--4 stacked dice.  ¨ 3 oz--1 deck of cards.  ¨ 1 tsp--1 dice.  ¨ 1 Tbsp--½ a Ping-Pong ball.  ¨ 2 Tbsp--1 Ping-Pong ball.  ¨ ½ cup--1 tennis ball or 1 cupcake wrapper.  ¨ 1 cup--1 baseball.     This information is not intended to replace advice given to you by your health care provider. Make sure you " discuss any questions you have with your health care provider.     Document Released: 12/18/2006 Document Revised: 01/08/2016 Document Reviewed: 10/23/2014  [employer] Interactive Patient Education ©2017 [employer]

## 2018-03-20 NOTE — PROGRESS NOTES
Chris Haji MD  Moe Bridges  1945 03/20/2018    Patient Active Problem List   Diagnosis   • SSS (sick sinus syndrome), status post permanent dual-chamber pacemaker implantation in 2010 with a [employer] device.   • Paroxysmal atrial fibrillation, status post RF ablation in 2007.   • Type 2 diabetes mellitus   • Essential hypertension   • Dyslipidemia associated with type 2 diabetes mellitus   • Small bowel obstruction   • Dyspnea on exertion (NYHA class III)     Dear Chris Haji MD:    Chief Complaint   Patient presents with   • Sick sinus syndrome     Subjective     Moe Bridges is a 72 y.o. male with a past medical history significant for Sick sinus syndrome status post implantation of a permanent dual-chamber pacemaker in 2010 with a [employer] medical device.  He also has a history of paroxysmal atrial fibrillation status post RF ablation in 2007, hypertension, dyslipidemia, and type 2 diabetes mellitus.  Presents to the office today with his wife for routine follow-up.  He has chronic stable shortness of breath with mild to moderate exertion which has not worsened recently.  He denies any palpitations or fluttering of his heart.  No complaints of pedal edema, chest pains or discomfort.  He missed his last pacemaker interrogation due to illness.  He has not been on anticoagulation since his ablation in 2007.      Current Outpatient Prescriptions:   •  ascorbic acid (VITAMIN C) 1000 MG tablet, Take 1,000 mg by mouth Daily., Disp: , Rfl:   •  aspirin 81 MG chewable tablet, Chew 81 mg Daily., Disp: , Rfl:   •  chlorzoxazone (PARAFON FORTE) 500 MG tablet, Take 500 mg by mouth 4 (Four) Times a Day As Needed for muscle spasms., Disp: , Rfl:   •  citalopram (CeleXA) 40 MG tablet, Take 40 mg by mouth Daily., Disp: , Rfl:   •  docusate sodium (COLACE) 100 MG capsule, Take 100 mg by mouth 2 (Two) Times a Day., Disp: , Rfl:   •  furosemide (LASIX) 80 MG tablet, Take 0.5 tablets by mouth  Daily As Needed (for swelling). (Patient taking differently: Take 80 mg by mouth Daily As Needed (for swelling).), Disp: , Rfl:   •  glyBURIDE (DIAbeta) 5 MG tablet, Take 10 mg by mouth 2 (Two) Times a Day., Disp: , Rfl:   •  lisinopril (PRINIVIL,ZESTRIL) 2.5 MG tablet, Take 2.5 mg by mouth Daily., Disp: , Rfl:   •  LORazepam (ATIVAN) 0.5 MG tablet, Take 0.5 mg by mouth Every 8 (Eight) Hours As Needed for anxiety., Disp: , Rfl:   •  Magnesium Citrate powder, , Disp: , Rfl:   •  nortriptyline (PAMELOR) 50 MG capsule, Take 50 mg by mouth Every Night., Disp: , Rfl:   •  omega-3 acid ethyl esters (LOVAZA) 1 G capsule, Take 2 g by mouth 2 (Two) Times a Day., Disp: , Rfl:   •  oxyCODONE (oxyCONTIN) 10 MG 12 hr tablet, Take 10 mg by mouth 3 (Three) Times a Day As Needed for severe pain (7-10)., Disp: , Rfl:   •  pioglitazone (ACTOS) 30 MG tablet, Take 30 mg by mouth Daily., Disp: , Rfl:   •  potassium chloride (K-DUR) 10 MEQ CR tablet, Take 10 mEq by mouth Every Morning., Disp: , Rfl:   •  predniSONE (DELTASONE) 10 MG tablet, Take 10 mg by mouth Every Morning., Disp: , Rfl:   •  pregabalin (LYRICA) 100 MG capsule, Take 100 mg by mouth 3 (Three) Times a Day., Disp: , Rfl:   •  umeclidinium-vilanterol (ANORO ELLIPTA) 62.5-25 MCG/INH aerosol powder  inhaler, Inhale 1 puff Daily., Disp: 1 each, Rfl: 6  •  zolpidem (AMBIEN) 10 MG tablet, Take 10 mg by mouth At Night As Needed for Sleep., Disp: , Rfl:   •  Omega-3 Fatty Acids (FISH OIL) 1000 MG capsule capsule, Take 2,000 mg by mouth 2 (Two) Times a Day With Meals., Disp: , Rfl:     The following portions of the patient's history were reviewed and updated as appropriate: allergies, current medications, past family history, past medical history, past social history, past surgical history and problem list.    Review of Systems   Constitution: Positive for weakness.   Cardiovascular: Positive for dyspnea on exertion, near-syncope (When coughing hard) and orthopnea. Negative for  "chest pain, irregular heartbeat, leg swelling and palpitations.   Respiratory: Positive for shortness of breath.         COPD     Neurological: Positive for light-headedness.     Objective   Blood pressure 141/80, pulse 65, resp. rate 16, height 177.8 cm (70\"), weight 122 kg (270 lb), SpO2 91 %.  Body mass index is 38.74 kg/m².\    Physical Exam   Constitutional: He is oriented to person, place, and time. He appears well-developed and well-nourished. No distress.   HENT:   Head: Normocephalic and atraumatic.   Eyes: Conjunctivae are normal. Right eye exhibits no discharge. Left eye exhibits no discharge.   Neck: Normal range of motion. Neck supple. Carotid bruit is not present.   Cardiovascular: Normal rate, regular rhythm and normal heart sounds.  Exam reveals no gallop and no friction rub.    No murmur heard.  Pulmonary/Chest: Effort normal and breath sounds normal. No respiratory distress. He has no wheezes. He has no rales. He exhibits no tenderness.   Musculoskeletal: Normal range of motion. He exhibits no edema.   Neurological: He is alert and oriented to person, place, and time.   Skin: Skin is warm and dry. No rash noted. He is not diaphoretic. No erythema. No pallor.   Psychiatric: He has a normal mood and affect. His behavior is normal.   Nursing note and vitals reviewed.      ECG 12 Lead  Date/Time: 3/20/2018 3:22 PM  Performed by: HEMA YAÑEZ  Authorized by: HEMA YAÑEZ   Comparison: compared with previous ECG   Rhythm: paced  Rate: normal  BPM: 70  Clinical impression: normal ECG  Comments: Ventricular paced versus AV paced rhythm.  Difficult to make out atrial spike.           Assessment:          Diagnosis Plan   1. SSS (sick sinus syndrome)     2. Paroxysmal atrial fibrillation, status post RF ablation in 2007.     3. Essential hypertension     4. Dyslipidemia associated with type 2 diabetes mellitus          Plan:       1. Discussed the patient's BMI with him. BMI is above " normal parameters. Follow-up plan includes:  educational material.  2. Avoid sodium and continue to monitor blood pressure.  3. I discussed with him about atrial fibrillation and its implications for stroke.  I have explained that if we  any recurrent atrial fibrillation on his pacemaker interrogation, that we would need to discuss chronic oral anticoagulation.  He states he absolutely refuses to take warfarin and would likely be unable to afford the newer anticoagulants.  For now, we will get his pacemaker interrogated as he is due for routine check and consider checking on the cost of normal anticoagulants if any atrial fibrillation is appreciated.  Patient is agreeable to check on the cost if needed.  4. He was given an appointment for his next pacemaker interrogation.  5. We will follow-up in 6 months or sooner if needed.    No Follow-up on file.    I appreciate the opportunity to participate in this patient's cardiovascular care.    Best Regards,    Bonny Santos PA-C

## 2018-04-12 ENCOUNTER — CLINICAL SUPPORT (OUTPATIENT)
Dept: CARDIOLOGY | Facility: CLINIC | Age: 73
End: 2018-04-12

## 2018-04-12 DIAGNOSIS — I49.5 SSS (SICK SINUS SYNDROME) (HCC): Primary | ICD-10-CM

## 2018-04-12 PROCEDURE — 93280 PM DEVICE PROGR EVAL DUAL: CPT | Performed by: INTERNAL MEDICINE

## 2018-09-12 ENCOUNTER — OFFICE VISIT (OUTPATIENT)
Dept: CARDIOLOGY | Facility: CLINIC | Age: 73
End: 2018-09-12

## 2018-09-12 VITALS
BODY MASS INDEX: 37.22 KG/M2 | HEIGHT: 70 IN | RESPIRATION RATE: 16 BRPM | WEIGHT: 260 LBS | DIASTOLIC BLOOD PRESSURE: 69 MMHG | OXYGEN SATURATION: 92 % | SYSTOLIC BLOOD PRESSURE: 114 MMHG | HEART RATE: 61 BPM

## 2018-09-12 DIAGNOSIS — E78.5 DYSLIPIDEMIA ASSOCIATED WITH TYPE 2 DIABETES MELLITUS (HCC): Chronic | ICD-10-CM

## 2018-09-12 DIAGNOSIS — I10 ESSENTIAL HYPERTENSION: Chronic | ICD-10-CM

## 2018-09-12 DIAGNOSIS — E11.69 DYSLIPIDEMIA ASSOCIATED WITH TYPE 2 DIABETES MELLITUS (HCC): Chronic | ICD-10-CM

## 2018-09-12 DIAGNOSIS — I49.5 SSS (SICK SINUS SYNDROME) (HCC): Primary | Chronic | ICD-10-CM

## 2018-09-12 DIAGNOSIS — I48.0 PAROXYSMAL ATRIAL FIBRILLATION (HCC): Chronic | ICD-10-CM

## 2018-09-12 DIAGNOSIS — E66.01 MORBID OBESITY (HCC): ICD-10-CM

## 2018-09-12 DIAGNOSIS — J44.9 CHRONIC OBSTRUCTIVE PULMONARY DISEASE, UNSPECIFIED COPD TYPE (HCC): ICD-10-CM

## 2018-09-12 DIAGNOSIS — R06.09 DYSPNEA ON EXERTION: ICD-10-CM

## 2018-09-12 DIAGNOSIS — E11.9 TYPE 2 DIABETES MELLITUS WITHOUT COMPLICATION, WITHOUT LONG-TERM CURRENT USE OF INSULIN (HCC): Chronic | ICD-10-CM

## 2018-09-12 PROCEDURE — 99214 OFFICE O/P EST MOD 30 MIN: CPT | Performed by: INTERNAL MEDICINE

## 2018-09-12 PROCEDURE — 93000 ELECTROCARDIOGRAM COMPLETE: CPT | Performed by: INTERNAL MEDICINE

## 2018-09-12 NOTE — PROGRESS NOTES
Moe Bridges  1945 09/12/2018   Ref:No referring provider defined for this encounter.  Pcp: Chris Haji MD  [address] [place] [address]  [place] [address]    Follow up for:  Chief Complaint   Patient presents with   • Atrial Fibrillation     follow up   • COPD   • Hypertension        Patient Active Problem List   Diagnosis   • SSS (sick sinus syndrome), status post permanent dual-chamber pacemaker implantation in 2010 with a [employer] device.   • Paroxysmal atrial fibrillation, status post RF ablation in 2007.   • Type 2 diabetes mellitus (CMS/HCC)   • Essential hypertension   • Dyslipidemia associated with type 2 diabetes mellitus (CMS/HCC)   • Small bowel obstruction   • Dyspnea on exertion (NYHA class III)       HPI     Moe Bridges is a 72 yo male who presents to the office today for a follow-up visit of SSS and paroxysmal atrial fibrillation. Patient notes shortness of breath and states it is the same for the past 30 years. Patient notes orthopnea x 5 thin pillows. Patient notes dizziness when he stands up. Patient denies chest pain, edema, palpitations or syncope. He has dual chamber pacemaker.   Patient has neuropathy and his muscles are very weak.    Review of Systems   Cardiovascular: Negative for chest pain, leg swelling, palpitations and syncope.   Respiratory: Positive for shortness of breath.    Neurological: Positive for dizziness.         Current Outpatient Prescriptions:   •  ascorbic acid (VITAMIN C) 1000 MG tablet, Take 1,000 mg by mouth Daily., Disp: , Rfl:   •  aspirin 81 MG chewable tablet, Chew 81 mg Daily., Disp: , Rfl:   •  chlorzoxazone (PARAFON FORTE) 500 MG tablet, Take 500 mg by mouth 4 (Four) Times a Day As Needed for muscle spasms., Disp: , Rfl:   •  citalopram (CeleXA) 40 MG tablet, Take 40 mg by mouth Daily., Disp: , Rfl:   •  docusate sodium (COLACE) 100 MG capsule, Take 100 mg by mouth 2 (Two) Times a Day., Disp: , Rfl:   •  furosemide (LASIX) 80 MG tablet, Take 0.5 tablets  "by mouth Daily As Needed (for swelling). (Patient taking differently: Take 80 mg by mouth Daily.), Disp: , Rfl:   •  glyBURIDE (DIAbeta) 5 MG tablet, Take 10 mg by mouth 2 (Two) Times a Day., Disp: , Rfl:   •  lisinopril (PRINIVIL,ZESTRIL) 2.5 MG tablet, Take 2.5 mg by mouth Daily., Disp: , Rfl:   •  LORazepam (ATIVAN) 0.5 MG tablet, Take 0.5 mg by mouth Every 8 (Eight) Hours As Needed for anxiety., Disp: , Rfl:   •  nortriptyline (PAMELOR) 50 MG capsule, Take 50 mg by mouth Every Night., Disp: , Rfl:   •  omega-3 acid ethyl esters (LOVAZA) 1 G capsule, Take 2 g by mouth 2 (Two) Times a Day., Disp: , Rfl:   •  Omega-3 Fatty Acids (FISH OIL) 1000 MG capsule capsule, Take 2,000 mg by mouth 2 (Two) Times a Day With Meals., Disp: , Rfl:   •  oxyCODONE (oxyCONTIN) 10 MG 12 hr tablet, Take 10 mg by mouth 3 (Three) Times a Day As Needed for severe pain (7-10)., Disp: , Rfl:   •  pioglitazone (ACTOS) 30 MG tablet, Take 30 mg by mouth Daily., Disp: , Rfl:   •  potassium chloride (K-DUR) 10 MEQ CR tablet, Take 10 mEq by mouth Every Morning. 1/2 tablet daily, Disp: , Rfl:   •  predniSONE (DELTASONE) 10 MG tablet, Take 10 mg by mouth Every Morning., Disp: , Rfl:   •  pregabalin (LYRICA) 100 MG capsule, Take 100 mg by mouth 3 (Three) Times a Day., Disp: , Rfl:   •  umeclidinium-vilanterol (ANORO ELLIPTA) 62.5-25 MCG/INH aerosol powder  inhaler, Inhale 1 puff Daily., Disp: 1 each, Rfl: 6  •  zolpidem (AMBIEN) 10 MG tablet, Take 10 mg by mouth At Night As Needed for Sleep., Disp: , Rfl:   •  Magnesium Citrate powder, , Disp: , Rfl:     Allergies   Allergen Reactions   • Handclens 2 In 1 [Benzalkonium Chloride]        /69 (BP Location: Right arm)   Pulse 61   Resp 16   Ht 177.8 cm (70\")   Wt 118 kg (260 lb)   SpO2 92%   BMI 37.31 kg/m²        Physical Exam   Constitutional: He is oriented to person, place, and time. He appears well-developed and well-nourished. No distress.   Morbid obesity    Cardiovascular: Normal " rate, regular rhythm and intact distal pulses.  Exam reveals distant heart sounds. Exam reveals no gallop and no friction rub.    No murmur heard.  Pulses:       Carotid pulses are 1+ on the right side, and 1+ on the left side.       Dorsalis pedis pulses are 1+ on the right side, and 2+ on the left side.        Posterior tibial pulses are 2+ on the right side, and 2+ on the left side.   Pulmonary/Chest: Effort normal. No respiratory distress. He has no wheezes. He has no rales. He exhibits no tenderness.   Moderately reduced breath sounds   Abdominal: Soft. He exhibits no distension and no mass. There is no tenderness. There is no rebound and no guarding.   Morbid obesity    Musculoskeletal: He exhibits edema (1 + edema ). He exhibits no tenderness or deformity.   Neurological: He is alert and oriented to person, place, and time. He has normal reflexes. He displays normal reflexes. No cranial nerve deficit. He exhibits normal muscle tone. Coordination normal.   Left foot tremor   Skin: Skin is warm and dry. No rash noted. He is not diaphoretic. No erythema. No pallor.   Psychiatric: He has a normal mood and affect. His behavior is normal. Judgment and thought content normal.   :    Lab Review:      ECG 12 Lead  Date/Time: 9/12/2018 11:51 AM  Performed by: JOEL PRITCHARD  Authorized by: JOEL PRITCHARD   Comparison: compared with previous ECG from 3/20/2018  Similar to previous ECG  Rhythm: paced  Ectopy: atrial premature contractions  Rate: normal  Clinical impression: abnormal ECG  Comments: A-V paced rhythm.             Lab Results   Component Value Date     12/01/2017    K 3.9 12/01/2017     12/01/2017    CO2 33.0 (H) 12/01/2017    BUN 22 (H) 12/01/2017    CREATININE 1.23 12/01/2017    GLUCOSE 120 (H) 12/01/2017    CALCIUM 9.0 12/01/2017    AST 43 (H) 12/01/2017    ALT 62 (H) 12/01/2017    ALKPHOS 60 12/01/2017    LABIL2 1.5 08/10/2015     Lab Results   Component Value Date    CKTOTAL 285 (H)  04/14/2014     Lab Results   Component Value Date    WBC 7.14 01/04/2017    HGB 14.2 01/04/2017    HCT 44.2 01/04/2017     (L) 01/04/2017     No results found for: INR  Lab Results   Component Value Date    MG 2.0 01/04/2017     Lab Results   Component Value Date    PSA 0.09 11/20/2014    CHLPL 196 11/20/2014    TRIG 144 12/01/2017    HDL 42 (L) 12/01/2017     (H) 12/01/2017      Lab Results   Component Value Date     (H) 03/10/2015       Chemistry        Component Value Date/Time     12/01/2017 1114    K 3.9 12/01/2017 1114     12/01/2017 1114    CO2 33.0 (H) 12/01/2017 1114    BUN 22 (H) 12/01/2017 1114    CREATININE 1.23 12/01/2017 1114        Component Value Date/Time    CALCIUM 9.0 12/01/2017 1114    ALKPHOS 60 12/01/2017 1114    AST 43 (H) 12/01/2017 1114    ALT 62 (H) 12/01/2017 1114    BILITOT 0.5 12/01/2017 1114            Impression:   Diagnosis Plan   1. SSS (sick sinus syndrome), status post permanent dual-chamber pacemaker implantation in 2010 with a [employer] device.  Adult Transthoracic Echo Complete W/ Cont if Necessary Per Protocol   2. Paroxysmal atrial fibrillation, status post RF ablation in 2007.  Adult Transthoracic Echo Complete W/ Cont if Necessary Per Protocol    Comprehensive Metabolic Panel   3. Essential hypertension  Adult Transthoracic Echo Complete W/ Cont if Necessary Per Protocol    Comprehensive Metabolic Panel   4. Dyslipidemia associated with type 2 diabetes mellitus (CMS/HCC)  Lipid Panel    Comprehensive Metabolic Panel   5. Dyspnea on exertion (NYHA class III)  Adult Transthoracic Echo Complete W/ Cont if Necessary Per Protocol    Comprehensive Metabolic Panel    XR Chest PA & Lateral   6. Type 2 diabetes mellitus without complication, without long-term current use of insulin (CMS/HCC)         Plan:  1.Fasting Lipid panel- may have been obtained from Dr. Haji.   2. CMP- may have been obtained from Dr. Haji  3. Echocardiogram  4. Chest  x-ray      Return in about 6 months (around 3/12/2019) for or sooner if needed..     This document signed by Gigi Dominguez MD September 12, 2018 11:59 AM     Scribed for Gigi Dominguez MD by Amber Carrera CMA. 9/12/2018  1:52 PM    I, Gigi Dominguez MD, personally performed the services described in this documentation as scribed by the above named individual in my presence, and it is both accurate and complete.  9/12/2018  1:52 PM